# Patient Record
Sex: FEMALE | Race: WHITE | Employment: OTHER | URBAN - NONMETROPOLITAN AREA
[De-identification: names, ages, dates, MRNs, and addresses within clinical notes are randomized per-mention and may not be internally consistent; named-entity substitution may affect disease eponyms.]

---

## 2021-01-04 ENCOUNTER — HOSPITAL ENCOUNTER (OUTPATIENT)
Dept: OCCUPATIONAL THERAPY | Age: 71
Setting detail: THERAPIES SERIES
Discharge: HOME | End: 2021-01-04
Payer: MEDICARE

## 2021-01-04 PROCEDURE — 97165 OT EVAL LOW COMPLEX 30 MIN: CPT

## 2021-01-04 NOTE — DISCHARGE SUMMARY
[facility]  OCCUPATIONAL THERAPY  DRIVING EVALUATION    PATIENT: Alex De La Garza  YOB: 1950  GENDER:  female  CSN: [id number]   REFERRING PHYSICIAN:   Erika Richards PA-C  DIAGNOSIS:  Memory deficit, mood disorder, diabetes, altered mental status  DRIVING HISTORY:    Reports driving in the city and the country. [place]. PMH: Please see medical history questionnaire for past medical history, allergies, and medications. INSURANCE INFORMATION: Medicare  PATIENT GOALS:    Continue driving    OBJECTIVE  VISUAL SKILLS(using the [employer]imag[employer] 2000 Visual Evaluator)        FAR VISUAL ACUITY:   Pass. Left eye 20/40, right eye 20/30, both eyes 20/20     STEREO DEPTH:   Able to correctly detect until 8     FUSION:    Pass. Arrow pointing to number 9     PERIPHERAL VISION:  Pass. Patient able to detect a flash of light at nasal, 55, 70 and 85 with the right and the left. DYNAVISION TESTING:  Mode A 60 second interval with 52 hits. PHYSICAL SKILLS  RANGE OF MOTION:Within functional limits for driving  STRENGTH: Within functional limits for driving  TRANSFER IN/OUT OF SIMULATOR: Within functional limits for driving  AMBULATION: Within functional limits for driving    IN VEHICLE MANIPULATION SKILLS:  Steering Wheel:Within functional limits for driving   Gas Pedal:  Within functional limits for driving  Brake Pedal: Within functional limits for driving  Turn Signal: Within functional limits for driving  Seat Belt: Within functional limits for driving     COGNITIVE SKILLS  ORIENTATION:  Within functional limits for driving  ATTENTION SPAN:Within functional limits for driving  FRUSTRATION TOLERANCE:Within functional limits for driving  IMPULSIVITY:Within functional limits for driving  DIRECTION FOLLOWING:Within functional limits for driving  R/L DISCRIMINATION:Within functional limits for driving  MEMORY:Within functional limits for driving  JUDGMENT: Within functional limits for driving    COGNITIVE TESTING:     Trail Making Part A: 32 seconds    Trail Making Part B: 82 seconds    Clock Test: 4/4       SHORT BLESSED TEST:   The Short Blessed Test is a screening tool to assess orientation, short term memory, long term memory, and reversal of learning. Overall this patient received a score of  0-4 which indicates normal cognition. [place]       SIMULATED DRIVING ASSESSMENT:  WARM-UP:    (54 MPH, 2 nic highway with several curves. Light on-coming traffic. There are no intersections, pedestrians, cross traffic, slow traffic, etc.)    Total off road crashes: 0 (Good performance is 0)   Total collisions with vehicles and roadway objects: 0 (Good performance is 0)   Percentage of time over the posted speed limit: 0 (Good performance is within +/- 5% of the posted speed limit.)   Percentage of time out of lanes: 0 (Good performance would be less than 5%, moderate is less than 10%, greater than 10% is considered poor.)        BRAKE REACTION TIME:  (The brake reaction time test consists of presenting a large stop sign in the center of the visual display. The appropriate response if for the  to release the gas and apply the brakes as quickly as possible.)     Total pedal reaction time: 0.66 seconds (Average value is below 0.7 seconds.)   Gas pedal reaction time: 0.44 seconds (For good performance, this should be less than 0.4 seconds; poor performance is above 0.55 seconds)   Stopping distance: 162 feet (Good performance is less than 175 feet, moderate is between 175 and 220 feet, greater than 220 feet is considered poor.)   Speed at stimulus: 52 mph        SIMULATED DRIVING TREATMENT:  WIDE FIELD DIVIDED ATTENTION:  (In this task, the  will navigate a two nic, six mile highway with both curves and hills. There will be no obstacles in front of the  but there will be on-coming traffic.  Divided Attention (DA) events are presented, however they will only be presented on the side monitors thus forcing the  to scan the entire scene, not just the center monitor. The DA events include only left and right arrows. The  will be presented with sixteen different DA events. The DA events will appear on the outside half of the left and right monitors with eight symbols being shown in the upper quadrant and four will be shown in the lower quadrant. The  will also need to maintain their speed and nic position during the drive.)     Total off road crashes: 0 (Good performance is 0)   Total collisions with vehicles and roadway objects: 0 (Good performance is 0)   Percentage of time over the posted speed limit: 1% (Good performance would be less than 5%, moderate would be less than 10% and anything greater than 10% would be poor)   Percentage of time out of lanes: 0.4% (Good performance would be less than 1%, moderate would be less than 2.5% and anything greater than 2.5% would be poor)   Total correct attention responses: 13/16. (Good performance is all symbols responded to correctly, moderate performance would be missing 1-2.)   Number of upper left symbols missed: 2/4   Number of lower left symbols missed: 0/4   Number of upper right symbols missed: 0/4   Number of lower right symbols missed: 1/4   Average speed: 42 mph (Good performance is +/- 5 MPH of the posted speed limit. Average speed on this drive is 50 MPH.)   Maximum speed: 60 mph   Minimum speed: 28 mph   Notes: Decreased speed noted to increase ability to divide attention. SUBURBAN DRIVE:  (Two-nic residential road and school zones  by curved roadway sections.  Hazards include: pedestrians, cross traffic not stopping at unmarked intersections, and vehicles backing out of drives.)   Total number of off road crashes: 0 (Good performance is 0.)   Total number of collisions with vehicles and other roadway objects: 0 (Good performance is 0.)   Total number of traffic light tickets: 0 (Good performance is 0.)   Total number of stop sign tickets: 0 (Good performance is 0.)   Percentage of time over the posted speed limit: 3% (Good performance would be less than 5%, moderate would be less than 10% and anything greater than 10% would be poor)   Collision with cross traffic vehicles? No   Total pedal reaction time to cross traffic vehicles: 10 seconds  (Average value is below 0.7 seconds)   Collision with backing vehicle? No   Total pedal reaction time to backing vehicle: 2 seconds  (Average value is below 0.7 seconds)   Collision with pedestrian or animal? No   Total pedal reaction time to pedestrian or animal: 0.43 secibds (Average value is below 0.7 seconds)   Did the  exceed the posted speed limit in school zone? Yes 26 mph in a 25 zone     METRO DRIVING:     Notes: Unable to complete the Metro drive due to nausea. ASSESSMENT AND PLAN    RESULTS: Patient tested as safe to return to independent driving in regions that she is familiar. Min difficulty with dividing attention during driving simulation. Controlling attention with decreased speed. RECOMMENDATIONS:   If physician is in agreement, recommend PT for reported balance deficits to avoid/prevent falls and ST for reported memory and cognitive deficits. Patient has been instructed to contact referring physician to discuss results of this evaluation. Patient has been informed that his or her physician is responsible for making the final decision regarding driving status. Thank you for this referral.    History: Low Complexity: brief history completed. Reviewed medical and therapy records related to presenting problem    Performance Deficits/Examination: Low Complexity: 1-2 performance deficits related to presenting problem that result in activity limitations or participation restrictions.       Clinical Decision Making: Clinical Decision making was of Low Complexity as the result of analysis of data from a problem focused assessment, a consideration of a limited number of treatment options, no significant comorbidities affecting the plan of care and no modification or assistance required to complete the evaluation. Evaluation Complexity: Based on the findings of patient history, examination, clinical presentation, and decision making during this evaluation, this patient is of low complexity.     Time in: 0950   Time out: 1100  Timed treatment: 0  Total time: 70 minutes          Rosie Duke, [facility], OTR/L [id number]

## 2024-09-28 ENCOUNTER — APPOINTMENT (OUTPATIENT)
Dept: CT IMAGING | Age: 74
End: 2024-09-28
Payer: MEDICARE

## 2024-09-28 ENCOUNTER — HOSPITAL ENCOUNTER (INPATIENT)
Age: 74
LOS: 4 days | Discharge: HOME | End: 2024-10-02
Attending: EMERGENCY MEDICINE | Admitting: SURGERY
Payer: MEDICARE

## 2024-09-28 DIAGNOSIS — R55 SYNCOPE, UNSPECIFIED SYNCOPE TYPE: ICD-10-CM
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
## 2024-09-28 PROBLEM — I60.9 SUBARACHNOID BLEED (HCC): Status: ACTIVE | Noted: 2024-09-28

## 2024-09-28 LAB
25(OH)D3 SERPL-MCNC: 44 NG/ML (ref 30–100)
ABO + RH BLD: NORMAL
ANION GAP SERPL CALCULATED.3IONS-SCNC: 13 MMOL/L (ref 9–16)
ARM BAND NUMBER: NORMAL
BLOOD BANK SAMPLE EXPIRATION: NORMAL
BLOOD BANK SPECIMEN: ABNORMAL
BLOOD GROUP ANTIBODIES SERPL: NEGATIVE
BODY TEMPERATURE: 37
BUN SERPL-MCNC: 10 MG/DL (ref 8–23)
CHLORIDE SERPL-SCNC: 105 MMOL/L (ref 98–107)
CO2 SERPL-SCNC: 21 MMOL/L (ref 20–31)
COHGB MFR BLD: 6 % (ref 0–5)
CREAT SERPL-MCNC: 0.8 MG/DL (ref 0.5–0.9)
ERYTHROCYTE [DISTWIDTH] IN BLOOD BY AUTOMATED COUNT: 14.6 % (ref 11.8–14.4)
EST. AVERAGE GLUCOSE BLD GHB EST-MCNC: 117 MG/DL
ETHANOL PERCENT: <0.01 %
ETHANOLAMINE SERPL-MCNC: <10 MG/DL (ref 0–0.08)
FIO2 ON VENT: ABNORMAL %
GFR, ESTIMATED: 73 ML/MIN/1.73M2
GLUCOSE SERPL-MCNC: 160 MG/DL (ref 74–99)
HBA1C MFR BLD: 5.7 % (ref 4–6)
HCO3 VENOUS: 20.5 MMOL/L (ref 24–30)
HCT VFR BLD AUTO: 39.1 % (ref 36.3–47.1)
HGB BLD-MCNC: 12.7 G/DL (ref 11.9–15.1)
INR PPP: 1.1
MCH RBC QN AUTO: 31.8 PG (ref 25.2–33.5)
MCHC RBC AUTO-ENTMCNC: 32.5 G/DL (ref 28.4–34.8)
MCV RBC AUTO: 97.8 FL (ref 82.6–102.9)
NRBC BLD-RTO: 0 PER 100 WBC
O2 SAT, VEN: 99.8 % (ref 60–85)
PARTIAL THROMBOPLASTIN TIME: 27.9 SEC (ref 23–36.5)
PCO2 VENOUS: 20.8 MM HG (ref 39–55)
PH VENOUS: 7.6 (ref 7.32–7.42)
PLATELET # BLD AUTO: ABNORMAL K/UL (ref 138–453)
PLATELET, FLUORESCENCE: 60 K/UL (ref 138–453)
PLATELETS.RETICULATED NFR BLD AUTO: 2.5 % (ref 1.1–10.3)
PO2 VENOUS: 135 MM HG (ref 30–50)
POSITIVE BASE EXCESS, VEN: 0.6 MMOL/L (ref 0–2)
POTASSIUM SERPL-SCNC: 4 MMOL/L (ref 3.7–5.3)
PROTHROMBIN TIME: 14.4 SEC (ref 11.7–14.9)
RBC # BLD AUTO: 4 M/UL (ref 3.95–5.11)
SODIUM SERPL-SCNC: 139 MMOL/L (ref 136–145)
T4 FREE SERPL-MCNC: 1.5 NG/DL (ref 0.92–1.68)
TSH SERPL DL<=0.05 MIU/L-ACNC: 1.69 UIU/ML (ref 0.27–4.2)
VIT B12 SERPL-MCNC: 546 PG/ML (ref 232–1245)
WBC OTHER # BLD: 5.5 K/UL (ref 3.5–11.3)

## 2024-09-28 PROCEDURE — 82805 BLOOD GASES W/O2 SATURATION: CPT

## 2024-09-28 PROCEDURE — G0480 DRUG TEST DEF 1-7 CLASSES: HCPCS

## 2024-09-28 PROCEDURE — 6370000000 HC RX 637 (ALT 250 FOR IP)

## 2024-09-28 PROCEDURE — 99285 EMERGENCY DEPT VISIT HI MDM: CPT

## 2024-09-28 PROCEDURE — 84439 ASSAY OF FREE THYROXINE: CPT

## 2024-09-28 PROCEDURE — 6360000002 HC RX W HCPCS

## 2024-09-28 PROCEDURE — 82565 ASSAY OF CREATININE: CPT

## 2024-09-28 PROCEDURE — 2060000000 HC ICU INTERMEDIATE R&B

## 2024-09-28 PROCEDURE — 85055 RETICULATED PLATELET ASSAY: CPT

## 2024-09-28 PROCEDURE — 82947 ASSAY GLUCOSE BLOOD QUANT: CPT

## 2024-09-28 PROCEDURE — 85027 COMPLETE CBC AUTOMATED: CPT

## 2024-09-28 PROCEDURE — 86901 BLOOD TYPING SEROLOGIC RH(D): CPT

## 2024-09-28 PROCEDURE — 6360000004 HC RX CONTRAST MEDICATION

## 2024-09-28 PROCEDURE — 86900 BLOOD TYPING SEROLOGIC ABO: CPT

## 2024-09-28 PROCEDURE — 81001 URINALYSIS AUTO W/SCOPE: CPT

## 2024-09-28 PROCEDURE — 84520 ASSAY OF UREA NITROGEN: CPT

## 2024-09-28 PROCEDURE — 96374 THER/PROPH/DIAG INJ IV PUSH: CPT

## 2024-09-28 PROCEDURE — 80051 ELECTROLYTE PANEL: CPT

## 2024-09-28 PROCEDURE — 86850 RBC ANTIBODY SCREEN: CPT

## 2024-09-28 PROCEDURE — 84443 ASSAY THYROID STIM HORMONE: CPT

## 2024-09-28 PROCEDURE — 99222 1ST HOSP IP/OBS MODERATE 55: CPT | Performed by: SURGERY

## 2024-09-28 PROCEDURE — 84703 CHORIONIC GONADOTROPIN ASSAY: CPT

## 2024-09-28 PROCEDURE — 85610 PROTHROMBIN TIME: CPT

## 2024-09-28 PROCEDURE — 83036 HEMOGLOBIN GLYCOSYLATED A1C: CPT

## 2024-09-28 PROCEDURE — 99221 1ST HOSP IP/OBS SF/LOW 40: CPT | Performed by: INTERNAL MEDICINE

## 2024-09-28 PROCEDURE — 82607 VITAMIN B-12: CPT

## 2024-09-28 PROCEDURE — 92523 SPEECH SOUND LANG COMPREHEN: CPT

## 2024-09-28 PROCEDURE — 85730 THROMBOPLASTIN TIME PARTIAL: CPT

## 2024-09-28 PROCEDURE — 80307 DRUG TEST PRSMV CHEM ANLYZR: CPT

## 2024-09-28 PROCEDURE — 82306 VITAMIN D 25 HYDROXY: CPT

## 2024-09-28 PROCEDURE — 2580000003 HC RX 258

## 2024-09-28 PROCEDURE — 71260 CT THORAX DX C+: CPT

## 2024-09-28 PROCEDURE — 99223 1ST HOSP IP/OBS HIGH 75: CPT | Performed by: PSYCHIATRY & NEUROLOGY

## 2024-09-28 PROCEDURE — 93005 ELECTROCARDIOGRAM TRACING: CPT | Performed by: INTERNAL MEDICINE

## 2024-09-28 RX ORDER — FENTANYL CITRATE 50 UG/ML
50 INJECTION, SOLUTION INTRAMUSCULAR; INTRAVENOUS ONCE
Status: COMPLETED | OUTPATIENT
Start: 2024-09-28 | End: 2024-09-28

## 2024-09-28 RX ORDER — LANOLIN ALCOHOL/MO/W.PET/CERES
1000 CREAM (GRAM) TOPICAL
COMMUNITY

## 2024-09-28 RX ORDER — PANTOPRAZOLE SODIUM 40 MG/1
40 TABLET, DELAYED RELEASE ORAL
Status: DISCONTINUED | OUTPATIENT
Start: 2024-09-28 | End: 2024-10-02 | Stop reason: HOSPADM

## 2024-09-28 RX ORDER — GABAPENTIN 600 MG/1
600 TABLET ORAL NIGHTLY
COMMUNITY
Start: 2024-09-03

## 2024-09-28 RX ORDER — GABAPENTIN 600 MG/1
600 TABLET ORAL NIGHTLY
Status: DISCONTINUED | OUTPATIENT
Start: 2024-09-28 | End: 2024-10-02 | Stop reason: HOSPADM

## 2024-09-28 RX ORDER — LEVOTHYROXINE SODIUM 112 UG/1
112 TABLET ORAL DAILY
Status: DISCONTINUED | OUTPATIENT
Start: 2024-09-28 | End: 2024-10-02 | Stop reason: HOSPADM

## 2024-09-28 RX ORDER — GABAPENTIN 100 MG/1
200 CAPSULE ORAL EVERY MORNING
Status: DISCONTINUED | OUTPATIENT
Start: 2024-09-28 | End: 2024-10-02 | Stop reason: HOSPADM

## 2024-09-28 RX ORDER — LEVOTHYROXINE SODIUM 112 UG/1
112 TABLET ORAL DAILY
COMMUNITY

## 2024-09-28 RX ORDER — POLYETHYLENE GLYCOL 3350 17 G/17G
17 POWDER, FOR SOLUTION ORAL DAILY
Status: DISCONTINUED | OUTPATIENT
Start: 2024-09-28 | End: 2024-09-30

## 2024-09-28 RX ORDER — SODIUM CHLORIDE 9 MG/ML
INJECTION, SOLUTION INTRAVENOUS PRN
Status: DISCONTINUED | OUTPATIENT
Start: 2024-09-28 | End: 2024-09-30

## 2024-09-28 RX ORDER — GABAPENTIN 100 MG/1
200 CAPSULE ORAL EVERY MORNING
COMMUNITY

## 2024-09-28 RX ORDER — PRAVASTATIN SODIUM 40 MG
40 TABLET ORAL DAILY
COMMUNITY
Start: 2024-07-06

## 2024-09-28 RX ORDER — PROPRANOLOL HCL 10 MG
10 TABLET ORAL 2 TIMES DAILY
Status: DISCONTINUED | OUTPATIENT
Start: 2024-09-28 | End: 2024-10-02 | Stop reason: HOSPADM

## 2024-09-28 RX ORDER — PRAZOSIN HYDROCHLORIDE 1 MG/1
2 CAPSULE ORAL NIGHTLY
Status: DISCONTINUED | OUTPATIENT
Start: 2024-09-28 | End: 2024-10-02 | Stop reason: HOSPADM

## 2024-09-28 RX ORDER — LIDOCAINE 4 G/G
1 PATCH TOPICAL DAILY
Status: DISCONTINUED | OUTPATIENT
Start: 2024-09-29 | End: 2024-10-02 | Stop reason: HOSPADM

## 2024-09-28 RX ORDER — IOPAMIDOL 755 MG/ML
130 INJECTION, SOLUTION INTRAVASCULAR
Status: COMPLETED | OUTPATIENT
Start: 2024-09-28 | End: 2024-09-28

## 2024-09-28 RX ORDER — ACETAMINOPHEN 500 MG
1000 TABLET ORAL EVERY 8 HOURS SCHEDULED
Status: DISCONTINUED | OUTPATIENT
Start: 2024-09-28 | End: 2024-10-02 | Stop reason: HOSPADM

## 2024-09-28 RX ORDER — METHOCARBAMOL 750 MG/1
750 TABLET, FILM COATED ORAL 4 TIMES DAILY
Status: DISCONTINUED | OUTPATIENT
Start: 2024-09-28 | End: 2024-09-30

## 2024-09-28 RX ORDER — ONDANSETRON 2 MG/ML
4 INJECTION INTRAMUSCULAR; INTRAVENOUS EVERY 6 HOURS PRN
Status: DISCONTINUED | OUTPATIENT
Start: 2024-09-28 | End: 2024-10-02 | Stop reason: HOSPADM

## 2024-09-28 RX ORDER — BUPROPION HYDROCHLORIDE 300 MG/1
300 TABLET ORAL DAILY
Status: DISCONTINUED | OUTPATIENT
Start: 2024-09-28 | End: 2024-10-02 | Stop reason: HOSPADM

## 2024-09-28 RX ORDER — DULOXETIN HYDROCHLORIDE 60 MG/1
30 CAPSULE, DELAYED RELEASE ORAL DAILY
COMMUNITY

## 2024-09-28 RX ORDER — OXYCODONE HCL 5 MG/5 ML
5 SOLUTION, ORAL ORAL EVERY 6 HOURS PRN
Status: DISCONTINUED | OUTPATIENT
Start: 2024-09-28 | End: 2024-09-28

## 2024-09-28 RX ORDER — UREA 10 %
1000 LOTION (ML) TOPICAL EVERY MORNING
Status: DISCONTINUED | OUTPATIENT
Start: 2024-09-28 | End: 2024-10-02 | Stop reason: HOSPADM

## 2024-09-28 RX ORDER — OMEPRAZOLE 40 MG/1
40 CAPSULE, DELAYED RELEASE ORAL 2 TIMES DAILY
COMMUNITY
Start: 2024-09-21

## 2024-09-28 RX ORDER — POTASSIUM CHLORIDE 29.8 MG/ML
20 INJECTION INTRAVENOUS PRN
Status: DISCONTINUED | OUTPATIENT
Start: 2024-09-28 | End: 2024-09-30

## 2024-09-28 RX ORDER — DULOXETIN HYDROCHLORIDE 30 MG/1
30 CAPSULE, DELAYED RELEASE ORAL DAILY
Status: DISCONTINUED | OUTPATIENT
Start: 2024-09-28 | End: 2024-10-02 | Stop reason: HOSPADM

## 2024-09-28 RX ORDER — OXYCODONE HYDROCHLORIDE 5 MG/1
5 TABLET ORAL EVERY 6 HOURS PRN
Status: DISCONTINUED | OUTPATIENT
Start: 2024-09-28 | End: 2024-10-02

## 2024-09-28 RX ORDER — ONDANSETRON 4 MG/1
4 TABLET, ORALLY DISINTEGRATING ORAL EVERY 8 HOURS PRN
Status: DISCONTINUED | OUTPATIENT
Start: 2024-09-28 | End: 2024-10-02 | Stop reason: HOSPADM

## 2024-09-28 RX ORDER — ACETAMINOPHEN 325 MG/1
650 TABLET ORAL EVERY 6 HOURS PRN
Status: DISCONTINUED | OUTPATIENT
Start: 2024-09-28 | End: 2024-09-28

## 2024-09-28 RX ORDER — PROPRANOLOL HCL 10 MG
10 TABLET ORAL 2 TIMES DAILY
COMMUNITY
Start: 2024-08-18

## 2024-09-28 RX ORDER — GABAPENTIN 300 MG/1
300 CAPSULE ORAL 3 TIMES DAILY
Status: DISCONTINUED | OUTPATIENT
Start: 2024-09-28 | End: 2024-09-28

## 2024-09-28 RX ORDER — PRAZOSIN HYDROCHLORIDE 1 MG/1
2 CAPSULE ORAL NIGHTLY
COMMUNITY
Start: 2024-09-09

## 2024-09-28 RX ORDER — BUPROPION HYDROCHLORIDE 150 MG/1
300 TABLET ORAL DAILY
COMMUNITY

## 2024-09-28 RX ORDER — MAGNESIUM SULFATE IN WATER 40 MG/ML
2000 INJECTION, SOLUTION INTRAVENOUS PRN
Status: DISCONTINUED | OUTPATIENT
Start: 2024-09-28 | End: 2024-09-30

## 2024-09-28 RX ORDER — POTASSIUM CHLORIDE 7.45 MG/ML
10 INJECTION INTRAVENOUS PRN
Status: DISCONTINUED | OUTPATIENT
Start: 2024-09-28 | End: 2024-09-30

## 2024-09-28 RX ORDER — PRAVASTATIN SODIUM 20 MG
40 TABLET ORAL DAILY
Status: DISCONTINUED | OUTPATIENT
Start: 2024-09-28 | End: 2024-10-02 | Stop reason: HOSPADM

## 2024-09-28 RX ORDER — SODIUM CHLORIDE 0.9 % (FLUSH) 0.9 %
5-40 SYRINGE (ML) INJECTION EVERY 12 HOURS SCHEDULED
Status: DISCONTINUED | OUTPATIENT
Start: 2024-09-28 | End: 2024-09-30

## 2024-09-28 RX ORDER — SODIUM CHLORIDE 0.9 % (FLUSH) 0.9 %
5-40 SYRINGE (ML) INJECTION PRN
Status: DISCONTINUED | OUTPATIENT
Start: 2024-09-28 | End: 2024-10-02 | Stop reason: HOSPADM

## 2024-09-28 RX ADMIN — FENTANYL CITRATE 50 MCG: 50 INJECTION, SOLUTION INTRAMUSCULAR; INTRAVENOUS at 03:18

## 2024-09-28 RX ADMIN — LEVOTHYROXINE SODIUM 112 MCG: 112 TABLET ORAL at 07:53

## 2024-09-28 RX ADMIN — PROPRANOLOL HYDROCHLORIDE 10 MG: 10 TABLET ORAL at 07:53

## 2024-09-28 RX ADMIN — CYANOCOBALAMIN TAB 500 MCG 1000 MCG: 500 TAB at 08:01

## 2024-09-28 RX ADMIN — OXYCODONE HYDROCHLORIDE 5 MG: 5 SOLUTION ORAL at 20:31

## 2024-09-28 RX ADMIN — PRAZOSIN HYDROCHLORIDE 2 MG: 1 CAPSULE ORAL at 21:54

## 2024-09-28 RX ADMIN — ACETAMINOPHEN 1000 MG: 500 TABLET ORAL at 21:54

## 2024-09-28 RX ADMIN — OXYCODONE HYDROCHLORIDE 5 MG: 5 SOLUTION ORAL at 07:58

## 2024-09-28 RX ADMIN — PANTOPRAZOLE SODIUM 40 MG: 40 TABLET, DELAYED RELEASE ORAL at 16:30

## 2024-09-28 RX ADMIN — GABAPENTIN 300 MG: 300 CAPSULE ORAL at 08:00

## 2024-09-28 RX ADMIN — METHOCARBAMOL 750 MG: 750 TABLET ORAL at 13:25

## 2024-09-28 RX ADMIN — SODIUM CHLORIDE, PRESERVATIVE FREE 10 ML: 5 INJECTION INTRAVENOUS at 07:54

## 2024-09-28 RX ADMIN — DULOXETINE HYDROCHLORIDE 30 MG: 30 CAPSULE, DELAYED RELEASE ORAL at 07:53

## 2024-09-28 RX ADMIN — IOPAMIDOL 130 ML: 755 INJECTION, SOLUTION INTRAVENOUS at 01:03

## 2024-09-28 RX ADMIN — METHOCARBAMOL 750 MG: 750 TABLET ORAL at 07:54

## 2024-09-28 RX ADMIN — GABAPENTIN 200 MG: 100 CAPSULE ORAL at 07:54

## 2024-09-28 RX ADMIN — GABAPENTIN 300 MG: 300 CAPSULE ORAL at 13:25

## 2024-09-28 RX ADMIN — PANTOPRAZOLE SODIUM 40 MG: 40 TABLET, DELAYED RELEASE ORAL at 07:53

## 2024-09-28 RX ADMIN — METHOCARBAMOL 750 MG: 750 TABLET ORAL at 20:32

## 2024-09-28 RX ADMIN — PRAVASTATIN SODIUM 40 MG: 20 TABLET ORAL at 07:54

## 2024-09-28 RX ADMIN — BUPROPION HYDROCHLORIDE 300 MG: 300 TABLET, EXTENDED RELEASE ORAL at 07:53

## 2024-09-28 RX ADMIN — GABAPENTIN 600 MG: 600 TABLET, FILM COATED ORAL at 20:32

## 2024-09-28 RX ADMIN — ONDANSETRON 4 MG: 2 INJECTION INTRAMUSCULAR; INTRAVENOUS at 04:54

## 2024-09-28 RX ADMIN — METHOCARBAMOL 750 MG: 750 TABLET ORAL at 16:30

## 2024-09-28 RX ADMIN — PROPRANOLOL HYDROCHLORIDE 10 MG: 10 TABLET ORAL at 20:32

## 2024-09-28 RX ADMIN — SODIUM CHLORIDE, PRESERVATIVE FREE 10 ML: 5 INJECTION INTRAVENOUS at 20:32

## 2024-09-28 ASSESSMENT — PAIN SCALES - GENERAL
PAINLEVEL_OUTOF10: 8
PAINLEVEL_OUTOF10: 4
PAINLEVEL_OUTOF10: 7
PAINLEVEL_OUTOF10: 3
PAINLEVEL_OUTOF10: 5
PAINLEVEL_OUTOF10: 9
PAINLEVEL_OUTOF10: 3
PAINLEVEL_OUTOF10: 3
PAINLEVEL_OUTOF10: 7

## 2024-09-28 ASSESSMENT — PAIN DESCRIPTION - LOCATION
LOCATION: HEAD;BACK
LOCATION: HEAD;NECK
LOCATION: NECK
LOCATION: HEAD;NECK
LOCATION: BACK;HEAD;NECK
LOCATION: NECK;BACK

## 2024-09-28 ASSESSMENT — PAIN - FUNCTIONAL ASSESSMENT
PAIN_FUNCTIONAL_ASSESSMENT: PREVENTS OR INTERFERES SOME ACTIVE ACTIVITIES AND ADLS
PAIN_FUNCTIONAL_ASSESSMENT: PREVENTS OR INTERFERES SOME ACTIVE ACTIVITIES AND ADLS

## 2024-09-28 ASSESSMENT — PAIN DESCRIPTION - DESCRIPTORS
DESCRIPTORS: ACHING;DISCOMFORT
DESCRIPTORS: ACHING;DISCOMFORT
DESCRIPTORS: ACHING
DESCRIPTORS: ACHING

## 2024-09-28 ASSESSMENT — PAIN DESCRIPTION - ORIENTATION
ORIENTATION: MID;PROXIMAL
ORIENTATION: MID;POSTERIOR

## 2024-09-28 NOTE — PROGRESS NOTES
15 Variable Trauma-Specific Frailty Index   Comorbidities   Cancer History Yes (1) - kidney No (0)   Coronary Heart Disease MI (1) CABG (0.75) Mild (0.25)  No (0)   Dementia Severe (1) Moderate (0.5) Mild (0.25)  No (0)   Daily Activities   Help With Grooming Yes (1) No (0)   Help with Managing Money Yes (1) No (0)   Help doing Housework Yes (1) No (0)   Help with Toileting Yes (1) No (0)   Help Walking Wheelchair (1) Walker (0.75) Cane (0.5) No (0)   Health Attitude   Feel Less Useful Most Time (1) Sometimes (0.5) Never (0)   Feel Sad Most Time (1) Sometimes (0.5) Never (0)   Feel Effort to Do Everything Most Time (1) Sometimes (0.5) Never (0)   Feels Lonely Most Time (1) Sometimes (0.5) Never (0)   Falls Most Time (1) Sometimes (0.5) Never (0)   Function   Sexually Active Yes (0)  No(1)   Nutrition   Albumin < 3g/dL (1)  > 3g/dL   Scoring   Score   FI (Score/15)  5.5/15 > 0.25 = Frail   *Based on 2-weeks prior to hospital admission   Trauma Specific Fraility Index > or = to 4 (4/15 = 0.26)  Trauma Specific Fraility Index Score:    5.5/15      Brea Ortiz DO 09/28/24  4:16 AM   General Surgery PGY 1

## 2024-09-28 NOTE — H&P
TRAUMA H&P/CONSULT    PATIENT NAME: Paradise Fairchild  YOB: 1950  MEDICAL RECORD NO. [id number]   DATE: 9/28/2024  PRIMARY CARE PHYSICIAN: Albaro Rodriges DO  PATIENT EVALUATED AT THE REQUEST OF : Sang NOLAN   Trauma Priority      IMPRESSION AND PLAN:   74Y/F presented with fall from standing height without loss of consciousness. No anticoagulation. Transferred from [facility] with CT head showing tiny SAH.     Diagnosis: SAH  Plan: BIG 2  Plan for admission to stepdown   Monitor neuro checks  C collar removed at outside facility  Strict bedrest  Home meds reordered  Geriatrics consulted due to +frailty, appreciate recommendations      If intracranial hemorrhage is present, is it a:  [] BIG 1  [x] BIG 2  [] BIG 3  If chest wall injury: Rib score___    CONSULT SERVICES    none     HISTORY:     Chief Complaint:  \"My head hurts\"    GENERAL DATA  Patient information was obtained from patient.  History/Exam limitations: none.  Injury Date: 9/27/24   Approximate Injury Time: unknown        Transport mode: Ambulance  Referring Hospital: [facility]    SETTING OF TRAUMATIC EVENT   Location : Home  Specific Details of Location: Other: unknown    MECHANISM OF INJURY    Fall From standing      HISTORY:     Paradise Fairchild is a female that presented to the Emergency Department following a fall at home. Pt reports she is unsure of the events leading up to the fall. She does report hitting her head, no LOC. Denies anticoagulation.    Traumatic loss of Consciousness: No    Total Fluids Given Prior To Arrival  mL    MEDICATIONS:   []  None     []  Information not available due to exam limitations documented above    Prior to Admission medications    Not on File       ALLERGIES:   []  None    []   Information not available due to exam limitations documented above     Nsaids    PAST MEDICAL/SURGICAL HISTORY: []  None   []   Information not available due to exam limitations documented above

## 2024-09-28 NOTE — ED PROVIDER NOTES
[facility] ED  Emergency Department Encounter  Emergency Medicine Resident     Pt Name:Paradise Fairchild  MRN: [id number]  Birthdate 1950  Date of evaluation: 9/28/24  PCP:  Albaro Rodriges DO  Note Started: 1:00 AM EDT      CHIEF COMPLAINT       Chief Complaint   Patient presents with    Fall     [facility]       HISTORY OF PRESENT ILLNESS  (Location/Symptom, Timing/Onset, Context/Setting, Quality, Duration, Modifying Factors, Severity.)      Paradise Fairchild is a 74 y.o. female past medical history of diabetes who presents with EMS as a transfer from outside hospital due to subarachnoid hemorrhage after a fall on 9/27.  Patient does not remember the fall, was at home, lives with her .  Patient denies blood thinners.  Reportedly this is a fall from standing.    PAST MEDICAL / SURGICAL / SOCIAL / FAMILY HISTORY      has no past medical history on file.     has no past surgical history on file.    Social History     Socioeconomic History    Marital status:      Spouse name: Not on file    Number of children: Not on file    Years of education: Not on file    Highest education level: Not on file   Occupational History    Not on file   Tobacco Use    Smoking status: Not on file    Smokeless tobacco: Not on file   Substance and Sexual Activity    Alcohol use: Not on file    Drug use: Not on file    Sexual activity: Not on file   Other Topics Concern    Not on file   Social History Narrative    Not on file     Social Determinants of Health     Financial Resource Strain: Not on file   Food Insecurity: Not on file   Transportation Needs: Not on file   Physical Activity: Not on file   Stress: Not on file   Social Connections: Not on file   Intimate Partner Violence: Not on file   Housing Stability: Not on file       History reviewed. No pertinent family history.    Allergies:  Nsaids    Home Medications:  Prior to Admission medications    Not on File       REVIEW OF SYSTEMS

## 2024-09-28 NOTE — ED NOTES
ED to inpatient nurses report      Chief Complaint:  Chief Complaint   Patient presents with    Fall     [facility]     Present to ED from: [facility] transfer    MOA:     LOC: alert and orientated to name, place, date  Mobility: Requires assistance * 1  Oxygen Baseline: Room air    Current needs required: 2L NC   Pending ED orders: N/A  Present condition: Alert and oriented x4. Pt has SH, c/o neck and back pain, denies blood thinner use    Why did the patient come to the ED?   Pt presents to ED via [facility] transfer d/t SH d/t fall that occurred on 9/27. Pt arrived alert and oriented x4 and c/o neck pain. Pt arrived in outside clothing and was changed into gown and provided with blankets upon arrival. Pt denies any blood thinner use.     What is the plan? Admission   Any procedures or intervention occur? Labs, imaging  Any safety concerns?? Fall risk    Mental Status:       Psych Assessment:      Vital signs   Vitals:    09/28/24 0323 09/28/24 0325 09/28/24 0328 09/28/24 0343   BP:   124/63 131/60   Pulse: 69 70 68 62   Resp: (!) 9 10 11 10   Temp:       SpO2: (!) 85% 99% 99% 98%   Weight:       Height:            Vitals:  Patient Vitals for the past 24 hrs:   BP Temp Pulse Resp SpO2 Height Weight   09/28/24 0343 131/60 -- 62 10 98 % -- --   09/28/24 0328 124/63 -- 68 11 99 % -- --   09/28/24 0325 -- -- 70 10 99 % -- --   09/28/24 0323 -- -- 69 (!) 9 (!) 85 % -- --   09/28/24 0312 138/66 -- 70 17 91 % -- --   09/28/24 0055 -- -- 71 12 94 % -- --   09/28/24 0051 -- -- -- -- -- 1.575 m (5' 2\") 64.4 kg (142 lb)   09/28/24 0051 -- 99 °F (37.2 °C) -- -- -- -- --   09/28/24 0051 (!) 159/83 -- 74 -- 94 % -- --   09/28/24 0050 -- -- -- -- 94 % -- --   09/28/24 0034 (!) 130/59 -- 79 -- 98 % -- --      Visit Vitals  /60   Pulse 62   Temp 99 °F (37.2 °C)   Resp 10   Ht 1.575 m (5' 2\")   Wt 64.4 kg (142 lb)   SpO2 98%   BMI 25.97 kg/m²        LDAs:   Peripheral IV 09/28/24 Left Antecubital

## 2024-09-28 NOTE — CONSULTS
[facility] Neurology   IN-PATIENT SERVICE      NEUROLOGY CONSULT  NOTE            Date:   9/28/2024  Patient name:  Paradise Fairchild  Date of admission:  9/28/2024  YOB: 1950      Chief Complaint:     Chief Complaint   Patient presents with   • Fall     [facility]     Reason for Consult:      LOC, concern for seizure     History of Present Illness:     The patient is a 74 y.o. female with PMH of DM type II, IBS who initially presented to [facility] after unwitnessed fall from standing high that patient does not remember, assuming positive LOC, there was a concern for subsequent delayed return to the [facility]. Patient was found to have small SAH, BIG2 and transferred to [facility]       Patient denies having any prodromes, tongue biting or urinary/bladder incontinence, no previous history of the seizures or LOC. IM is doing syncopal work up    Patient follows up with neurologist outpatient for chronic headache and has scheduled outpatient MRI     Past Medical History:     History reviewed. No pertinent past medical history.     Past Surgical History:     History reviewed. No pertinent surgical history.     Medications Prior to Admission:     Prior to Admission medications    Medication Sig Start Date End Date Taking? Authorizing Provider   gabapentin (NEURONTIN) 600 MG tablet Take 1 tablet by mouth nightly. at bedtime. 9/3/24  Yes ProviderOrly MD   omeprazole (PRILOSEC) 40 MG delayed release capsule Take 1 capsule by mouth in the morning and at bedtime 9/21/24  Yes ProviderOrly MD   pravastatin (PRAVACHOL) 40 MG tablet Take 1 tablet by mouth daily 7/6/24  Yes ProviderOrly MD   prazosin (MINIPRESS) 1 MG capsule Take 2 capsules by mouth nightly 9/9/24  Yes ProviderOrly MD   propranolol (INDERAL) 10 MG tablet Take 1 tablet by mouth 2 times daily 8/18/24  Yes ProviderOrly MD   tiZANidine (ZANAFLEX) 4 MG tablet Take 1 tablet by mouth every 8 hours  mass is seen.     1. No acute traumatic injury of the chest, abdomen, or pelvis. 2. No acute osseous abnormality of the thoracic or lumbar spine. 3. Cirrhosis with moderate splenomegaly and small volume of ascites.       Impression:      The patient is a 74 y.o. female with PMH of DM type II, IBS who initially presented to [facility] after unwitnessed fall from standing high that patient does not remember, assuming positive LOC, there was a concern for subsequent delayed return to the baseline. Neurology was consulted for the seizure work up.    Plan:     Collapse, likely syncopal episode, r/o seizures   SAH, BIG2  - will obtain routine EEG   - MRI brain W/WO contrast   - no indications for AEDs at this time unless above work up is abnormal   - orthostatic vitals: orders   - consider ECHO   - fall precautions     Electronically signed by Irina Bah MD on 9/28/2024 at 3:02 PM      Electronically signed by   Irina Bah MD  9/28/2024  3:02 PM

## 2024-09-28 NOTE — CONSULTS
[facility]  Office: [phone]  Barry Moe DO, Everett Cohen, DO, Abdulaziz Colindres DO, Prashant Benitez, DO, Steffanie Escamilla MD, Margarita Keene MD, Eliza Yang MD, Tatyana Swain MD,  Kin Simental MD, Willie Gonzalez MD, Marii Pedroza MD,  Jerman Soriano DO, Porfirio Wilder MD, Steven Rodriges MD, Duane Moe DO, Funmi Reyez MD,  Gerard Franco DO, Kelli Garibay MD, Chrissie Martinez MD, Lou Zhu MD, Margarita Corbin MD,  Rick Cooley MD, Sabi Conti MD, Geeta Lopes MD, Richelle Marie MD, Hang Helm MD, Nurys Monson MD, Yoel Rand DO, Jaylen Harrington DO, Saúl Olmos DO, Ayan Badillo MD, Shirley Waterhouse, CNP,  Jennyfer Mcallister CNP, Yeol Mitchell, CNP,  Niya Roldan, DNP, Camille Cabral, CNP, Lavinia Brown, CNP, Geni Wilkerson, CNP, Saira Reynolds, CNP, Ailyn Mcintyre, PA-C, Beryl Mcmahan PA-C, Mary Anne Kaur, CNP, Sandy Sanchez, CNP,  Madhuri Arreola, CNP, Jeny Goldman, CNP, Nelly Wu, CNP, Serena Collazo, CNS, Antionette Almaguer, CNP, Negra Barber, CNP, Tracy Schwab, CNP         [facility]   IN-PATIENT SERVICE   [facility]    CONSULTATION / HISTORY AND PHYSICAL EXAMINATION            Date:   9/28/2024  Patient name:  Paradise Fairchild  Date of admission:  9/28/2024 12:36 AM  MRN:   [id number]  Account:  [id number]  YOB: 1950  PCP:    Albaro Rodriges DO  Room:   [facility]0139-  Code Status:    Full Code    Physician Requesting Consult: Yanick Prieto*    Reason for Consult: Frailty    Chief Complaint:     Chief Complaint   Patient presents with    Fall     [facility]       History Obtained From:     patient, electronic medical record    History of Present Illness:     74-year-old female presents to the hospital for evaluation after having a fall at home.  Patient does not recall events after a fall.  She denies any prodromal symptoms.  Denies any history of seizures.  Denies any cardiac home  a harm to self or others can try oral low dose Seroquel or IM zyprexa if Qtc stable. Avoid physical restraints is possible.  Avoid benzodiazepines for delirium. Avoid anticholinergics.   Recommend Dietary consult to help optimize nutrition  Needs aggressive PTOT- Recommend turning patient per protocol to avoid wounds. Maintain fall precautions.   Monitor for urinary retention.    Recommend workup of syncope.  Will consult neurology, check EEG since patient had some confusion that could have been post ictal/ did not immediatly return to baseline. Check ECG, telemetry . Check echo if ecg shows any abnormalities . Check orthostatic blood pressure and pulse   Continue medication      Consultations:   IP CONSULT TO GERIATRICS      Jerman Soriano DO  9/28/2024  10:33 AM    Copy sent to Albaro Drummond DO

## 2024-09-28 NOTE — PROGRESS NOTES
Trauma Tertiary Survey    Admit Date: 9/28/2024  Hospital day 0      Subjective:     Patient is a 71-year-old female who presents reports a fall from standing height.  Patient is sitting in her chair resting comfortably.  Patient is about to eat dinner.  Patient's pain is controlled otherwise.    Objective:   PHYSICAL EXAM:   Physical Exam  Constitutional:       Appearance: Normal appearance.   HENT:      Head: Normocephalic and atraumatic.   Eyes:      Extraocular Movements: Extraocular movements intact.      Pupils: Pupils are equal, round, and reactive to light.   Cardiovascular:      Rate and Rhythm: Normal rate and regular rhythm.      Pulses: Normal pulses.   Pulmonary:      Effort: Pulmonary effort is normal.   Abdominal:      General: Abdomen is flat.      Palpations: Abdomen is soft.   Musculoskeletal:         General: Normal range of motion.      Cervical back: Normal range of motion. Tenderness present.   Skin:     General: Skin is warm and dry.   Neurological:      General: No focal deficit present.      Mental Status: She is alert.   Psychiatric:         Mood and Affect: Mood normal.         Behavior: Behavior normal.           Spine:     Spine Tenderness ROM   Cervical 2 /10 Normal   Thoracic 0 /10 Normal   Lumbar 0 /10 Normal     Musculoskeletal    Joint Tenderness Swelling ROM   Right shoulder absent absent normal   Left shoulder absent absent normal   Right elbow absent absent normal   Left elbow absent absent normal   Right wrist absent absent normal   Left wrist absent absent normal   Right hand grasp absent absent normal   Left hand grasp absent absent normal   Right hip absent absent normal   Left hip absent absent normal   Right knee absent absent normal   Left knee absent absent normal   Right ankle absent absent normal   Left ankle absent absent normal   Right foot absent absent normal   Left foot absent absent normal       CONSULTS: Internal medicine    PROCEDURES: None    [x] Reviewed

## 2024-09-28 NOTE — PROGRESS NOTES
Facility/Department: [address] STEPDOWN  Initial Speech/Language/Cognitive Assessment    NAME: Paradise Fairchild  : 1950   MRN: [id number]  ADMISSION DATE: 2024  ADMITTING DIAGNOSIS: has Subarachnoid bleed (HCC) and Fall on their problem list.    DATE ONSET: 2024      Date of Eval: 2024   Evaluating Therapist: Keyla Durand, SLP      RECENT RESULTS  CT OF HEAD/MRI: not available      Primary Complaint:   Paradise Fairchild is a 73 yo woman who had an unwitnessed fall at home, falling backwards and hitting the back of her head. She has no recollection of how it happened. Found to have small subarachnoid hemorrhage on exam     Pt transferred to [facility] for further assessment and management        IMPRESSIONS  Pt passed cognitive assessment  No overt clinical s/s of cognitive impairments at this time  Pt's spouse present and agrees        RECOMMENDATIONS  Skilled ST is not recommended      Pain:  Pain Assessment  Pain Assessment: 0-10  Pain Level: 3  Patient's Stated Pain Goal: 0 - No pain  Pain Location: Back, Head, Neck  Pain Descriptors: Aching  Functional Pain Assessment: Prevents or interferes some active activities and ADLs  Non-Pharmaceutical Pain Intervention(s): Rest  Response to Pain Intervention: Patient satisfied  Side Effects: No reported side effects    Vision/ Hearing       Assessment:  Cognitive Diagnosis: WFL  Aphasia Diagnosis: No s/s of aphasia  Speech Diagnosis: no s/s of motor speech disorder   Diagnosis: WFL    Recommendations:  Recommendations  Requires SLP Intervention: No             Plan:   Speech Therapy Prognosis  Prognosis: Good  Prognosis Considerations: Age;Medical Prognosis;Participation Level;Potential;Previous Level of Function;Family/Community Support;Parental Carry-over of Home Programming;Medical Diagnosis;Severity of Impairments    Goals:      Patient/family involved in developing goals and treatment plan: yes    Subjective:   Previous level of function and  limitations:  x25 years (2nd marriage for both) they have 5 children between them with 10 grandchildren and 6 great-grandchildren. Pt was employed as a  for the [employer]. She completed high school and earned a Bachelor's degree                    Objective:       Motor Speech  Apraxic Characteristics: None  Dysarthric Characteristics: None    Auditory Comprehension  Comprehension: Within Functional Limits         Expression  Primary Mode of Expression: Verbal    Verbal Expression  Verbal Expression: Within functional limits                   Cognition:      Orientation  Overall Orientation Status: Within Functional Limits  Attention  Attention: Within Functional Limits  Memory  Memory: Within Functional Limits  Problem Solving  Problem Solving: Within Functional Limits    Additional Assessments:          Prognosis:  Speech Therapy Prognosis  Prognosis: Good  Prognosis Considerations: Age;Medical Prognosis;Participation Level;Potential;Previous Level of Function;Family/Community Support;Parental Carry-over of Home Programming;Medical Diagnosis;Severity of Impairments    Education:             Therapy Time:   Individual Concurrent Group Co-treatment   Time In  138         Time Out  153         Minutes                   Electronically signed by KIM Abad on 9/28/2024 at 1:53 PM

## 2024-09-28 NOTE — CARE COORDINATION
SBIRT completed with pt. Pt admitted after fall at home.  Pt reports she drinks alcohol very rarely and if she does, has half glass of wine. She denies all drug use. Pt does admit to recent feelings of depression. She denies any SI. Stated her PCP has her on meds for depression. Stated she has had prior counseling. Pt was receptive to receiving additional counseling resources, which were provided. Pt encouraged to keep her PCP updated on how she is feeling.            Alcohol Screening and Brief Intervention        No results for input(s): \"ALC\" in the last 72 hours.    Alcohol Pre-screening     (WOMEN ONLY) How many times in the past year have you had 4 or more drinks in a day?: None       Drug Pre-Screening  - none       Drug Screening DAST       Mood Pre-Screening (PHQ-2)  During the past 2 weeks, have you been bothered by, feeling down, depressed or hopeless?  Yes  Feeling down, depressed or hopeless      I have interviewed Paradise Fairchild, [id number] regarding  Her alcohol consumption/drug use and risk for excessive use. Screenings were negative.  Patient  N/A intervention at this time.   Deferred []    Completed on: 9/28/2024   PADMINI GIRON

## 2024-09-28 NOTE — ED NOTES
Pt presents to ED via [facility] transfer d/t SH d/t fall that occurred on 9/27. Pt arrived alert and oriented x4 and c/o neck pain. Pt arrived in outside clothing and was changed into gown and provided with blankets upon arrival. Pt denies any blood thinner use.  Pt placed on full cardiac monitor, IV access established. Labs drawn.

## 2024-09-28 NOTE — ED PROVIDER NOTES
[facility]     Emergency Department     Faculty Attestation    I performed a history and physical examination of the patient and discussed management with the resident. I reviewed the resident’s note and agree with the documented findings including all diagnostic interpretations and plan of care. Any areas of disagreement are noted on the chart. I was personally present for the key portions of any procedures. I have documented in the chart those procedures where I was not present during the key portions. I have reviewed the emergency nurses triage note. I agree with the chief complaint, past medical history, past surgical history, allergies, medications, social and family history as documented unless otherwise noted below. Documentation of the HPI, Physical Exam and Medical Decision Making performed by naren is based on my personal performance of the HPI, PE and MDM. For Physician Assistant/ Nurse Practitioner cases/documentation I have personally evaluated this patient and have completed at least one if not all key elements of the E/M (history, physical exam, and MDM). Additional findings are as noted.    Primary Care Physician: Albaro Rodriges, DO    Note Started: 2:54 AM EDT     VITAL SIGNS:   height is 1.575 m (5' 2\") and weight is 64.4 kg (142 lb). Her temperature is 99 °F (37.2 °C). Her blood pressure is 159/83 (abnormal) and her pulse is 71. Her respiration is 12 and oxygen saturation is 94%.      Medical Decision Making  Fall, transfer from outlying facility.  Found to have small subarachnoid hemorrhage on exam.  Patient is alert oriented no reported blood thinners.  No numbness no weakness.  Airway intact equal breath sounds bilaterally.  Good pulses in all 4 extremities.  Moving all 4 extremities equally.  Impression subarachnoid hemorrhage.  Trauma priority based on injury.  Admission.    Amount and/or Complexity of Data Reviewed  Independent  Historian: EMS  External Data Reviewed: radiology and notes.  Labs: ordered. Decision-making details documented in ED Course.  Radiology: ordered.  Discussion of management or test interpretation with external provider(s): Trauma    Risk  Decision regarding hospitalization.        CRITICAL CARE: There was a high probability of clinically significant/life threatening deterioration in this patient's condition which required my urgent intervention.  Total critical care time was 18 minutes.  This excludes any time for separately reportable procedures.     Ariel Domínguez MD, FACEP, FAAEM  Attending Emergency Physician        Ariel Domínguez MD  09/28/24 0253

## 2024-09-29 ENCOUNTER — APPOINTMENT (OUTPATIENT)
Dept: CT IMAGING | Age: 74
End: 2024-09-29
Payer: MEDICARE

## 2024-09-29 PROBLEM — E03.9 ACQUIRED HYPOTHYROIDISM: Status: ACTIVE | Noted: 2024-09-29

## 2024-09-29 PROBLEM — R55 SYNCOPE: Status: ACTIVE | Noted: 2024-09-29

## 2024-09-29 PROBLEM — F32.A DEPRESSION: Status: ACTIVE | Noted: 2024-09-29

## 2024-09-29 PROBLEM — K21.9 GERD (GASTROESOPHAGEAL REFLUX DISEASE): Status: ACTIVE | Noted: 2024-09-29

## 2024-09-29 PROBLEM — M35.00 SJOGREN'S DISEASE (HCC): Status: ACTIVE | Noted: 2024-09-29

## 2024-09-29 PROBLEM — D69.6 THROMBOCYTOPENIA (HCC): Status: ACTIVE | Noted: 2024-09-29

## 2024-09-29 PROBLEM — R25.1 TREMOR: Status: ACTIVE | Noted: 2024-09-29

## 2024-09-29 PROBLEM — M79.7 FIBROMYALGIA: Status: ACTIVE | Noted: 2024-09-29

## 2024-09-29 LAB
ALBUMIN SERPL-MCNC: 3.2 G/DL (ref 3.5–5.2)
AMPHET UR QL SCN: NEGATIVE
ANION GAP SERPL CALCULATED.3IONS-SCNC: 8 MMOL/L (ref 9–16)
BACTERIA URNS QL MICRO: NORMAL
BARBITURATES UR QL SCN: NEGATIVE
BASOPHILS # BLD: <0.03 K/UL (ref 0–0.2)
BASOPHILS NFR BLD: 1 % (ref 0–2)
BENZODIAZ UR QL: NEGATIVE
BILIRUB UR QL STRIP: NEGATIVE
BUN SERPL-MCNC: 12 MG/DL (ref 8–23)
CALCIUM SERPL-MCNC: 8.2 MG/DL (ref 8.6–10.4)
CANNABINOIDS UR QL SCN: NEGATIVE
CASTS #/AREA URNS LPF: NORMAL /LPF (ref 0–8)
CHLORIDE SERPL-SCNC: 104 MMOL/L (ref 98–107)
CLARITY UR: CLEAR
CO2 SERPL-SCNC: 26 MMOL/L (ref 20–31)
COCAINE UR QL SCN: NEGATIVE
COLOR UR: YELLOW
CREAT SERPL-MCNC: 1 MG/DL (ref 0.5–0.9)
EKG ATRIAL RATE: 65 BPM
EKG P-R INTERVAL: 160 MS
EKG Q-T INTERVAL: 418 MS
EKG QRS DURATION: 64 MS
EKG QTC CALCULATION (BAZETT): 434 MS
EKG R AXIS: -166 DEGREES
EKG T AXIS: 135 DEGREES
EKG VENTRICULAR RATE: 65 BPM
EOSINOPHIL # BLD: 0.14 K/UL (ref 0–0.44)
EOSINOPHILS RELATIVE PERCENT: 4 % (ref 1–4)
EPI CELLS #/AREA URNS HPF: NORMAL /HPF (ref 0–5)
ERYTHROCYTE [DISTWIDTH] IN BLOOD BY AUTOMATED COUNT: 14.7 % (ref 11.8–14.4)
FENTANYL UR QL: POSITIVE
GFR, ESTIMATED: 57 ML/MIN/1.73M2
GLUCOSE BLD-MCNC: 175 MG/DL (ref 65–105)
GLUCOSE BLD-MCNC: 240 MG/DL (ref 65–105)
GLUCOSE SERPL-MCNC: 163 MG/DL (ref 74–99)
GLUCOSE UR STRIP-MCNC: NEGATIVE MG/DL
HCT VFR BLD AUTO: 34.9 % (ref 36.3–47.1)
HGB BLD-MCNC: 11.4 G/DL (ref 11.9–15.1)
HGB UR QL STRIP.AUTO: NEGATIVE
IMM GRANULOCYTES # BLD AUTO: <0.03 K/UL (ref 0–0.3)
IMM GRANULOCYTES NFR BLD: 1 %
KETONES UR STRIP-MCNC: NEGATIVE MG/DL
LEUKOCYTE ESTERASE UR QL STRIP: ABNORMAL
LYMPHOCYTES NFR BLD: 1.03 K/UL (ref 1.1–3.7)
LYMPHOCYTES RELATIVE PERCENT: 27 % (ref 24–43)
MCH RBC QN AUTO: 31.1 PG (ref 25.2–33.5)
MCHC RBC AUTO-ENTMCNC: 32.7 G/DL (ref 28.4–34.8)
MCV RBC AUTO: 95.1 FL (ref 82.6–102.9)
METHADONE UR QL: NEGATIVE
MONOCYTES NFR BLD: 0.41 K/UL (ref 0.1–1.2)
MONOCYTES NFR BLD: 11 % (ref 3–12)
NEUTROPHILS NFR BLD: 58 % (ref 36–65)
NEUTS SEG NFR BLD: 2.24 K/UL (ref 1.5–8.1)
NITRITE UR QL STRIP: NEGATIVE
NRBC BLD-RTO: 0 PER 100 WBC
OPIATES UR QL SCN: NEGATIVE
OXYCODONE UR QL SCN: POSITIVE
PCP UR QL SCN: NEGATIVE
PH UR STRIP: 5.5 [PH] (ref 5–8)
PLATELET # BLD AUTO: ABNORMAL K/UL (ref 138–453)
PLATELET, FLUORESCENCE: 51 K/UL (ref 138–453)
PLATELETS.RETICULATED NFR BLD AUTO: 4.3 % (ref 1.1–10.3)
POTASSIUM SERPL-SCNC: 4 MMOL/L (ref 3.7–5.3)
PROT UR STRIP-MCNC: NEGATIVE MG/DL
RBC # BLD AUTO: 3.67 M/UL (ref 3.95–5.11)
RBC # BLD: ABNORMAL 10*6/UL
RBC #/AREA URNS HPF: NORMAL /HPF (ref 0–4)
SODIUM SERPL-SCNC: 138 MMOL/L (ref 136–145)
SP GR UR STRIP: 1.02 (ref 1–1.03)
TEST INFORMATION: ABNORMAL
UROBILINOGEN UR STRIP-ACNC: NORMAL EU/DL (ref 0–1)
WBC #/AREA URNS HPF: NORMAL /HPF (ref 0–5)
WBC OTHER # BLD: 3.9 K/UL (ref 3.5–11.3)

## 2024-09-29 PROCEDURE — 2580000003 HC RX 258

## 2024-09-29 PROCEDURE — 80048 BASIC METABOLIC PNL TOTAL CA: CPT

## 2024-09-29 PROCEDURE — 93010 ELECTROCARDIOGRAM REPORT: CPT | Performed by: INTERNAL MEDICINE

## 2024-09-29 PROCEDURE — 97166 OT EVAL MOD COMPLEX 45 MIN: CPT

## 2024-09-29 PROCEDURE — 82947 ASSAY GLUCOSE BLOOD QUANT: CPT

## 2024-09-29 PROCEDURE — 70498 CT ANGIOGRAPHY NECK: CPT

## 2024-09-29 PROCEDURE — 97535 SELF CARE MNGMENT TRAINING: CPT

## 2024-09-29 PROCEDURE — 70450 CT HEAD/BRAIN W/O DYE: CPT

## 2024-09-29 PROCEDURE — 2060000000 HC ICU INTERMEDIATE R&B

## 2024-09-29 PROCEDURE — 6370000000 HC RX 637 (ALT 250 FOR IP)

## 2024-09-29 PROCEDURE — 85025 COMPLETE CBC W/AUTO DIFF WBC: CPT

## 2024-09-29 PROCEDURE — 99232 SBSQ HOSP IP/OBS MODERATE 35: CPT | Performed by: INTERNAL MEDICINE

## 2024-09-29 PROCEDURE — 6360000004 HC RX CONTRAST MEDICATION: Performed by: PSYCHIATRY & NEUROLOGY

## 2024-09-29 PROCEDURE — 82040 ASSAY OF SERUM ALBUMIN: CPT

## 2024-09-29 PROCEDURE — 85055 RETICULATED PLATELET ASSAY: CPT

## 2024-09-29 PROCEDURE — 36415 COLL VENOUS BLD VENIPUNCTURE: CPT

## 2024-09-29 PROCEDURE — 99232 SBSQ HOSP IP/OBS MODERATE 35: CPT | Performed by: SURGERY

## 2024-09-29 RX ORDER — BUTALBITAL, ACETAMINOPHEN AND CAFFEINE 50; 325; 40 MG/1; MG/1; MG/1
1 TABLET ORAL EVERY 4 HOURS PRN
Status: DISCONTINUED | OUTPATIENT
Start: 2024-09-29 | End: 2024-10-02 | Stop reason: HOSPADM

## 2024-09-29 RX ORDER — INSULIN LISPRO 100 [IU]/ML
0-4 INJECTION, SOLUTION INTRAVENOUS; SUBCUTANEOUS NIGHTLY
Status: DISCONTINUED | OUTPATIENT
Start: 2024-09-29 | End: 2024-10-02 | Stop reason: HOSPADM

## 2024-09-29 RX ORDER — GLUCAGON 1 MG/ML
1 KIT INJECTION PRN
Status: DISCONTINUED | OUTPATIENT
Start: 2024-09-29 | End: 2024-09-30

## 2024-09-29 RX ORDER — IOPAMIDOL 755 MG/ML
75 INJECTION, SOLUTION INTRAVASCULAR
Status: COMPLETED | OUTPATIENT
Start: 2024-09-29 | End: 2024-09-29

## 2024-09-29 RX ORDER — DEXTROSE MONOHYDRATE 100 MG/ML
INJECTION, SOLUTION INTRAVENOUS CONTINUOUS PRN
Status: DISCONTINUED | OUTPATIENT
Start: 2024-09-29 | End: 2024-09-30

## 2024-09-29 RX ORDER — INSULIN LISPRO 100 [IU]/ML
0-16 INJECTION, SOLUTION INTRAVENOUS; SUBCUTANEOUS
Status: DISCONTINUED | OUTPATIENT
Start: 2024-09-29 | End: 2024-10-02 | Stop reason: HOSPADM

## 2024-09-29 RX ADMIN — ACETAMINOPHEN 1000 MG: 500 TABLET ORAL at 13:26

## 2024-09-29 RX ADMIN — PANTOPRAZOLE SODIUM 40 MG: 40 TABLET, DELAYED RELEASE ORAL at 09:00

## 2024-09-29 RX ADMIN — METHOCARBAMOL 750 MG: 750 TABLET ORAL at 09:00

## 2024-09-29 RX ADMIN — PROPRANOLOL HYDROCHLORIDE 10 MG: 10 TABLET ORAL at 09:00

## 2024-09-29 RX ADMIN — BUPROPION HYDROCHLORIDE 300 MG: 300 TABLET, EXTENDED RELEASE ORAL at 09:00

## 2024-09-29 RX ADMIN — PANTOPRAZOLE SODIUM 40 MG: 40 TABLET, DELAYED RELEASE ORAL at 16:17

## 2024-09-29 RX ADMIN — LEVOTHYROXINE SODIUM 112 MCG: 112 TABLET ORAL at 09:00

## 2024-09-29 RX ADMIN — DULOXETINE HYDROCHLORIDE 30 MG: 30 CAPSULE, DELAYED RELEASE ORAL at 09:01

## 2024-09-29 RX ADMIN — OXYCODONE 5 MG: 5 TABLET ORAL at 11:02

## 2024-09-29 RX ADMIN — METHOCARBAMOL 750 MG: 750 TABLET ORAL at 13:26

## 2024-09-29 RX ADMIN — SODIUM CHLORIDE, PRESERVATIVE FREE 10 ML: 5 INJECTION INTRAVENOUS at 21:19

## 2024-09-29 RX ADMIN — PRAZOSIN HYDROCHLORIDE 2 MG: 1 CAPSULE ORAL at 21:21

## 2024-09-29 RX ADMIN — GABAPENTIN 600 MG: 600 TABLET, FILM COATED ORAL at 21:15

## 2024-09-29 RX ADMIN — ACETAMINOPHEN 1000 MG: 500 TABLET ORAL at 06:24

## 2024-09-29 RX ADMIN — OXYCODONE 5 MG: 5 TABLET ORAL at 21:15

## 2024-09-29 RX ADMIN — ACETAMINOPHEN 1000 MG: 500 TABLET ORAL at 23:18

## 2024-09-29 RX ADMIN — IOPAMIDOL 75 ML: 755 INJECTION, SOLUTION INTRAVENOUS at 15:14

## 2024-09-29 RX ADMIN — PRAVASTATIN SODIUM 40 MG: 20 TABLET ORAL at 09:01

## 2024-09-29 RX ADMIN — METHOCARBAMOL 750 MG: 750 TABLET ORAL at 21:15

## 2024-09-29 RX ADMIN — POLYETHYLENE GLYCOL 3350 17 G: 17 POWDER, FOR SOLUTION ORAL at 09:01

## 2024-09-29 RX ADMIN — METHOCARBAMOL 750 MG: 750 TABLET ORAL at 16:17

## 2024-09-29 RX ADMIN — SODIUM CHLORIDE, PRESERVATIVE FREE 10 ML: 5 INJECTION INTRAVENOUS at 09:01

## 2024-09-29 RX ADMIN — GABAPENTIN 200 MG: 100 CAPSULE ORAL at 09:00

## 2024-09-29 RX ADMIN — BUTALBITAL, ACETAMINOPHEN, AND CAFFEINE 1 TABLET: 325; 50; 40 TABLET ORAL at 23:49

## 2024-09-29 RX ADMIN — PROPRANOLOL HYDROCHLORIDE 10 MG: 10 TABLET ORAL at 21:15

## 2024-09-29 RX ADMIN — ONDANSETRON 4 MG: 4 TABLET, ORALLY DISINTEGRATING ORAL at 11:22

## 2024-09-29 RX ADMIN — CYANOCOBALAMIN TAB 500 MCG 1000 MCG: 500 TAB at 09:00

## 2024-09-29 ASSESSMENT — PAIN SCALES - GENERAL
PAINLEVEL_OUTOF10: 10
PAINLEVEL_OUTOF10: 2
PAINLEVEL_OUTOF10: 2
PAINLEVEL_OUTOF10: 7
PAINLEVEL_OUTOF10: 10
PAINLEVEL_OUTOF10: 7
PAINLEVEL_OUTOF10: 4
PAINLEVEL_OUTOF10: 3
PAINLEVEL_OUTOF10: 7

## 2024-09-29 ASSESSMENT — PAIN DESCRIPTION - DESCRIPTORS
DESCRIPTORS: ACHING;CRUSHING
DESCRIPTORS: ACHING
DESCRIPTORS: ACHING;POUNDING;SQUEEZING
DESCRIPTORS: ACHING

## 2024-09-29 ASSESSMENT — PAIN DESCRIPTION - LOCATION
LOCATION: HEAD;NECK
LOCATION: NECK;HEAD

## 2024-09-29 ASSESSMENT — PAIN DESCRIPTION - ORIENTATION
ORIENTATION: POSTERIOR

## 2024-09-29 ASSESSMENT — PAIN - FUNCTIONAL ASSESSMENT
PAIN_FUNCTIONAL_ASSESSMENT: PREVENTS OR INTERFERES SOME ACTIVE ACTIVITIES AND ADLS
PAIN_FUNCTIONAL_ASSESSMENT: PREVENTS OR INTERFERES WITH ALL ACTIVE AND SOME PASSIVE ACTIVITIES

## 2024-09-29 ASSESSMENT — PAIN DESCRIPTION - FREQUENCY: FREQUENCY: CONTINUOUS

## 2024-09-29 ASSESSMENT — PAIN DESCRIPTION - ONSET: ONSET: ON-GOING

## 2024-09-29 NOTE — PROCEDURES
PROCEDURE NOTE  Date: 9/29/2024   Name: Paradise Fairchild  YOB: 1950    Procedures        Please complete the MRI screening form.

## 2024-09-29 NOTE — PROGRESS NOTES
Occupational Therapy  Facility/Department: [address] STEPDOWN   Occupational Therapy Initial Evaluation    Patient Name: Paradise Fairchild        MRN: [id number]    : 1950    Date of Service: 2024    Chief Complaint   Patient presents with    Fall     [facility]     Discharge Recommendations No therapy recommended at discharge.       OT Equipment Recommendations  Equipment Needed: Yes  Mobility Devices: ADL Assistive Devices  ADL Assistive Devices: Shower Chair with back    Assessment  Performance deficits / Impairments: Decreased functional mobility ;Decreased ADL status;Decreased high-level IADLs;Decreased safe awareness;Decreased cognition;Decreased endurance;Decreased balance  Prognosis: Good  Decision Making: Medium Complexity  REQUIRES OT FOLLOW-UP: Yes  Activity Tolerance  Activity Tolerance: Patient Tolerated treatment well;Patient limited by pain  Safety Devices  Type of Devices: Gait belt;Call light within reach;Left in bed;Nurse notified;Patient at risk for falls (left with transport)  Restraints  Restraints Initially in Place: No    Restrictions/Precautions  Restrictions/Precautions  Restrictions/Precautions: Fall Risk;General Precautions  Required Braces or Orthoses?: Yes (Has a AFO-unsure of which foot has drop as pt does not need it for room distances)  Position Activity Restriction  Other position/activity restrictions: up w/ A    Subjective  General  Patient assessed for rehabilitation services?: Yes  Family / Caregiver Present: Yes (Spouse Kanu)  Diagnosis: Fall, SAH, +LOC?  Subjective  Subjective: RN approved therapy session, pt states having 7/10 H/A, distraction/activity increased, session cut short d/t transport arriving early in session       Home Setup/Prior Level of Function  Social/Functional History  Lives With: Spouse  Type of Home: House  Home Layout: Multi-level (bed and bath up 7 steps)  Home Access: Stairs to enter with rails;Stairs to enter without rails  Entrance  Stairs - Number of Steps: 3  Bathroom Shower/Tub: Walk-in shower  Bathroom Toilet: Standard  Home Equipment: Cane (has [employer])  ADL Assistance: Independent  Homemaking Assistance: Independent  Homemaking Responsibilities: Yes  Ambulation Assistance: Independent  Transfer Assistance: Independent  Active : Yes  Occupation: Retired    Vision/Hearing  Vision  Vision: Impaired  Vision Exceptions: Wears glasses at all times (Pt c/o eyelid drooping (ptosis?) bilat. this date, also photophobic d/t H/A)  Hearing  Hearing: Within functional limits    BUE Assessment  Gross Assessment  AROM: Within functional limits  PROM: Within functional limits  Strength: Within functional limits  Coordination: Within functional limits  Tone: Normal  Sensation: Intact  Hand Dominance: Right     Objective  Orientation  Overall Orientation Status: Within Functional Limits  Orientation Level: Oriented X4  Cognition  Overall Cognitive Status: WFL    Activities of Daily Living  Feeding: Independent  Grooming: Independent  UE Bathing: Modified independent   LE Bathing: Modified independent   UE Dressing: Modified independent   LE Dressing: Supervision  Toileting: Supervision  Additional Comments: Pt supine in bed upon arrival, pt transferred to EOB and writer facilitated pt in donning/doffing socks, pt demo'd good BUE strength overall, func mob observed to hallway as pt left in stretcher for CT scan    Balance  Balance  Sitting:  (Pt sat unsupported on EOB/stretcher for 6 min at (I))  Standing:  (Pt stood bedside/func mob for 30 seconds at SUP)    Transfers/Mobility  Bed mobility  Supine to Sit: Supervision  Sit to Supine: Modified independent (in stretcher)    Transfers  Sit to stand: Supervision  Stand to sit: Modified independent         Functional Mobility: Supervision  Functional Mobility Skilled Clinical Factors: Slow pace, gait belt only, func mob around bed to stretcher in hallway       Patient Education  Patient  Education  Education Given To: Patient;Family (Spouse educated on tripping hazards in home)  Education Provided: Role of Therapy;Plan of Care;Fall Prevention Strategies  Education Method: Verbal  Barriers to Learning: Vision  Education Outcome: Verbalized understanding;Continued education needed    Goals  Short Term Goals  Time Frame for Short Term Goals: Pt will by d/c  Short Term Goal 1: demo good safety awareness during func mob around room at (I)  Short Term Goal 2: demo ADL UB/LB bathing/dressing activity at (I)  Short Term Goal 3: identfiy 2 non-pharm. pain-relieving techniques with 1 cue  Short Term Goal 4: demo all bed mobility, including rolling, at (I)  Short Term Goal 5: demo bending/reaching high/low func activity for 8 min+, while standing, using LRAD PRN and mod I    Plan  Occupational Therapy Plan  Times Per Week: 4x  Current Treatment Recommendations: Self-Care / ADL, Pain management, Home management training, Safety education & training, Balance training, Functional mobility training, Patient/Caregiver education & training, Endurance training, Equipment evaluation, education, & procurement    AM-[facility] Daily Activities Inpatient  AM-PAC Daily Activity - Inpatient   How much help is needed for putting on and taking off regular lower body clothing?: A Little  How much help is needed for bathing (which includes washing, rinsing, drying)?: None  How much help is needed for toileting (which includes using toilet, bedpan, or urinal)?: A Little  How much help is needed for putting on and taking off regular upper body clothing?: None  How much help is needed for taking care of personal grooming?: None  How much help for eating meals?: None  AM-[facility] Inpatient Daily Activity Raw Score: 22  AM-PAC Inpatient ADL T-Scale Score : 47.1  ADL Inpatient CMS 0-100% Score: 25.8  ADL Inpatient CMS G-Code Modifier : CJ    Minutes  OT Individual Minutes  Time In: 1030  Time Out: 1041  Minutes: 11  Time Code Minutes   Timed

## 2024-09-29 NOTE — PROGRESS NOTES
[facility]  Office: [phone]  Barry Moe DO, Everett Cohen, DO, Abdulaziz Colindres DO, Prashant Benitez, DO, Steffanie Escamilla MD, Margarita Keene MD, Eliza Yang MD, Tatyana Swain MD,  Kin Simental MD, Willie Gonzalez MD, Marii Pedroza MD,  Jerman Soriano DO, Porfirio Wilder MD, Steven Rodriges MD, Duane Moe DO, Funmi Reyez MD,  Gerard Franco DO, Kelli Garibay MD, Chrissie Martinez MD, Lou Zhu MD, Margarita Corbin MD,  Rick Cooley MD, Sabi Conti MD, Geeta Lopes MD, Richelle Marie MD, Hang Helm MD, Nurys Monson MD, Yoel aRnd, DO, Jaylen Harrington DO, Saúl Olmos DO, Ayan Badillo MD, Shirley Waterhouse, CNP,  Jennyfer Mcallister CNP, Yoel Mitchell, CNP,  Niya Roldan, DNP, Camille Cabral, CNP, Lavinia Brown, CNP, Geni Wilkerson, CNP, Saira Reynolds, CNP, Ailyn Mcintyre, PA-C, Beryl Mcmahan PA-C, Mary Anne Kaur, CNP, Sandy Sanchez, CNP,  Madhuri Arreola, CNP, Jeny Goldman, CNP, Nelly Wu, CNP, Serena Collazo, CNS, Antionette Almaguer, CNP, Negra Barber, CNP, Tracy Schwab, CNP         IN-PATIENT SERVICE  [facility]    Progress Note    9/29/2024    11:56 AM    Name:   Paradise Fairchild  MRN:     [id number]     Acct:      [id number]   Room:   0139/0139-01   Day:  1  Admit Date:  9/28/2024 12:36 AM    PCP:   Albaro Rodriges DO  Code Status:  Full Code    Subjective:     C/C:   Chief Complaint   Patient presents with    Fall     [facility]     Interval History Status:   Improved  Minimal headache  No visual change  No paralysis or paresis    The patient still does not have a clear account of the circumstances leading to her admission  She had been found down  Patient does have a history of falls which have been attributed to foot drop    Data Base Updates:  WBC3.9k/uL RBC3.67 Low m/uL Eiganpxrbs28.4 Low   Platelet, Bqlvphrctqjb16 Low     Wzupoe604lmdv/L Potassium4.0mmol/L Anssibdh716btag/L RM100lugd/L Anion Gap8 Low mmol/L  12:51 AM     No results found for: \"SPECIAL\"  No results found for: \"CULTURE\"    Radiology:  CT HEAD WO CONTRAST    Result Date: 9/29/2024  Thin linear hyperdense focus immediately adjacent to the posterior aspect of the falx, which appears to extend into the right sub parietal sulcus. Findings may represent a small subarachnoid hemorrhage.  Recommend attention on follow-up.     CT CHEST ABDOMEN PELVIS W CONTRAST Additional Contrast? None    Result Date: 9/28/2024  1. No acute traumatic injury of the chest, abdomen, or pelvis. 2. No acute osseous abnormality of the thoracic or lumbar spine. 3. Cirrhosis with moderate splenomegaly and small volume of ascites.     CT THORACIC SPINE BONY RECONSTRUCTION    Result Date: 9/28/2024  1. No acute traumatic injury of the chest, abdomen, or pelvis. 2. No acute osseous abnormality of the thoracic or lumbar spine. 3. Cirrhosis with moderate splenomegaly and small volume of ascites.     CT LUMBAR SPINE BONY RECONSTRUCTION    Result Date: 9/28/2024  1. No acute traumatic injury of the chest, abdomen, or pelvis. 2. No acute osseous abnormality of the thoracic or lumbar spine. 3. Cirrhosis with moderate splenomegaly and small volume of ascites.       Assessment:        Primary Problem  Subarachnoid bleed (HCC)    Active Hospital Problems    Diagnosis Date Noted    Thrombocytopenia (HCC) [D69.6] 09/29/2024    Depression [F32.A] 09/29/2024    Acquired hypothyroidism [E03.9] 09/29/2024    GERD (gastroesophageal reflux disease) [K21.9] 09/29/2024    Fibromyalgia [M79.7] 09/29/2024    Sjogren's disease (HCC) [M35.00] 09/29/2024    Tremor [R25.1] 09/29/2024    Syncope [R55] 09/29/2024    Subarachnoid bleed (HCC) [I60.9] 09/28/2024    Fall [W19.XXXA] 09/28/2024         Plan:        Trauma eval  Neuro eval  EEG  MRI pending  Echo ordered  Suggest outpatient Holter monitoring  Pain management  Fall precautions  PT/OT  Reflux precautions  Trend platelets  DVT prophylaxis    IP CONSULT TO

## 2024-09-29 NOTE — PROGRESS NOTES
General Neurology Progress Note  [facility]            Date:   9/29/2024  Patient name:  Paradise Fairchild  Date of admission:  9/28/2024 12:36 AM  MRN:   [id number]  Account:  [id number]  YOB: 1950  PCP:    Albaro Rodriges DO  Room:   [facility]/0139-01  Code Status:    Full Code  Chief Complaint:   Chief Complaint   Patient presents with    Fall     [facility]     Allergies: Nsaids  Interval history      Patient was seen and examined at bedside this morning. Labs, chart, and VS reviewed. No acute overnight events.     Review of Systems   Constitutional:  Negative for activity change, appetite change, chills, diaphoresis, fatigue and fever.   HENT:  Negative for congestion, drooling, rhinorrhea, sore throat, trouble swallowing and voice change.    Eyes:  Negative for photophobia, pain, discharge, redness, itching and visual disturbance.   Respiratory:  Negative for cough, choking, chest tightness, shortness of breath and wheezing.    Cardiovascular:  Negative for chest pain, palpitations and leg swelling.   Gastrointestinal:  Negative for abdominal distention, abdominal pain, constipation, diarrhea and nausea.   Endocrine: Negative for polydipsia, polyphagia and polyuria.   Genitourinary:  Negative for difficulty urinating and dysuria.   Musculoskeletal:  Negative for arthralgias, back pain, gait problem, joint swelling, myalgias and neck pain.   Neurological:  Positive for light-headedness. Negative for dizziness, seizures, syncope, facial asymmetry, speech difficulty, weakness, numbness and headaches.   Psychiatric/Behavioral:  Negative for agitation, behavioral problems, confusion, decreased concentration, dysphoric mood, hallucinations and sleep disturbance. The patient is not nervous/anxious and is not hyperactive.      Neurological Exam   BP (!) 96/55   Pulse 56   Temp 97.8 °F (36.6 °C) (Oral)   Resp 17   Ht 1.575 m (5' 2\")   Wt 64.4 kg (142 lb)   SpO2 99%

## 2024-09-29 NOTE — PLAN OF CARE
Problem: Discharge Planning  Goal: Discharge to home or other facility with appropriate resources  Outcome: Progressing     Problem: Pain  Goal: Verbalizes/displays adequate comfort level or baseline comfort level  Outcome: Progressing     Problem: Skin/Tissue Integrity  Goal: Absence of new skin breakdown  Description: 1.  Monitor for areas of redness and/or skin breakdown  2.  Assess vascular access sites hourly  3.  Every 4-6 hours minimum:  Change oxygen saturation probe site  4.  Every 4-6 hours:  If on nasal continuous positive airway pressure, respiratory therapy assess nares and determine need for appliance change or resting period.  Outcome: Progressing     Problem: Safety - Adult  Goal: Free from fall injury  Outcome: Progressing  Flowsheets (Taken 9/28/2024 2000)  Free From Fall Injury: Instruct family/caregiver on patient safety

## 2024-09-29 NOTE — PROGRESS NOTES
PROGRESS NOTE          PATIENT NAME: Paradise Fairchild  MEDICAL RECORD NO. [id number]  DATE: 9/29/2024    HD: # 1      DIAGNOSIS AND PLAN    Subarachnoid bleed, BIG 2   Continue to monitor neuro status  Neurology consulted due to concern for syncope/seizure-appreciate assistance  Will need PT/OT when ambulation is safe  Geriatrics following due to positive frailty  DVT Prophylaxis-held in the setting of intracranial bleed      Chief Complaint: \"I am doing okay today\"    SUBJECTIVE    Patient seen and examined at the bedside.  No acute events reported overnight.  Denies headache.  Denies bowel movement.  Eating regular diet without issue.    OBJECTIVE  VITALS:   Vitals:    09/29/24 0411   BP: (!) 97/52   Pulse: 62   Resp: 10   Temp: 98.6 °F (37 °C)   SpO2: 94%     Physical Exam  Constitutional:       Appearance: Normal appearance.   HENT:      Head: Normocephalic and atraumatic.      Right Ear: External ear normal.      Left Ear: External ear normal.      Nose: Nose normal.      Mouth/Throat:      Mouth: Mucous membranes are moist.   Eyes:      Extraocular Movements: Extraocular movements intact.   Cardiovascular:      Rate and Rhythm: Normal rate and regular rhythm.      Pulses: Normal pulses.   Pulmonary:      Effort: Pulmonary effort is normal.   Abdominal:      General: Abdomen is flat.      Palpations: Abdomen is soft.   Musculoskeletal:         General: Normal range of motion.      Cervical back: Normal range of motion.   Skin:     General: Skin is warm and dry.      Capillary Refill: Capillary refill takes less than 2 seconds.   Neurological:      General: No focal deficit present.      Mental Status: She is alert.      GCS: GCS eye subscore is 4. GCS verbal subscore is 5. GCS motor subscore is 6.   Psychiatric:         Mood and Affect: Mood normal.         Behavior: Behavior normal.           Drain/tube output:    No intake/output data recorded.  No intake/output data recorded.        LAB:  CBC:   Recent Labs

## 2024-09-29 NOTE — PLAN OF CARE
Problem: Discharge Planning  Goal: Discharge to home or other facility with appropriate resources  9/29/2024 1734 by Christa Winter RN  Outcome: Progressing  Flowsheets (Taken 9/29/2024 0800)  Discharge to home or other facility with appropriate resources:   Identify barriers to discharge with patient and caregiver   Identify discharge learning needs (meds, wound care, etc)   Arrange for needed discharge resources and transportation as appropriate   Refer to discharge planning if patient needs post-hospital services based on physician order or complex needs related to functional status, cognitive ability or social support system  9/29/2024 0651 by Marleni Munguia, RN  Outcome: Progressing     Problem: Pain  Goal: Verbalizes/displays adequate comfort level or baseline comfort level  9/29/2024 1734 by Christa Winter RN  Outcome: Progressing  9/29/2024 0651 by Marleni Munguia, RN  Outcome: Progressing     Problem: Skin/Tissue Integrity  Goal: Absence of new skin breakdown  Description: 1.  Monitor for areas of redness and/or skin breakdown  2.  Assess vascular access sites hourly  3.  Every 4-6 hours minimum:  Change oxygen saturation probe site  4.  Every 4-6 hours:  If on nasal continuous positive airway pressure, respiratory therapy assess nares and determine need for appliance change or resting period.  9/29/2024 1734 by Christa Winter RN  Outcome: Progressing  9/29/2024 0651 by Marleni Munguia, RN  Outcome: Progressing     Problem: Safety - Adult  Goal: Free from fall injury  9/29/2024 1734 by Christa Winter RN  Outcome: Progressing  Flowsheets (Taken 9/29/2024 0722)  Free From Fall Injury: Instruct family/caregiver on patient safety  9/29/2024 0651 by Marleni Munguia, RN  Outcome: Progressing  Flowsheets (Taken 9/28/2024 2000)  Free From Fall Injury: Instruct family/caregiver on patient safety

## 2024-09-30 ENCOUNTER — APPOINTMENT (OUTPATIENT)
Dept: MRI IMAGING | Age: 74
End: 2024-09-30
Payer: MEDICARE

## 2024-09-30 ENCOUNTER — APPOINTMENT (OUTPATIENT)
Age: 74
End: 2024-09-30
Attending: INTERNAL MEDICINE
Payer: MEDICARE

## 2024-09-30 PROBLEM — E11.65 UNCONTROLLED TYPE 2 DIABETES MELLITUS WITH HYPERGLYCEMIA (HCC): Status: ACTIVE | Noted: 2024-09-30

## 2024-09-30 LAB
ANION GAP SERPL CALCULATED.3IONS-SCNC: 9 MMOL/L (ref 9–16)
BASOPHILS # BLD: 0.03 K/UL (ref 0–0.2)
BASOPHILS NFR BLD: 1 % (ref 0–2)
BUN SERPL-MCNC: 14 MG/DL (ref 8–23)
CALCIUM SERPL-MCNC: 8.1 MG/DL (ref 8.6–10.4)
CHLORIDE SERPL-SCNC: 105 MMOL/L (ref 98–107)
CO2 SERPL-SCNC: 22 MMOL/L (ref 20–31)
CREAT SERPL-MCNC: 1.1 MG/DL (ref 0.5–0.9)
EOSINOPHIL # BLD: 0.2 K/UL (ref 0–0.44)
EOSINOPHILS RELATIVE PERCENT: 5 % (ref 1–4)
ERYTHROCYTE [DISTWIDTH] IN BLOOD BY AUTOMATED COUNT: 14.6 % (ref 11.8–14.4)
GFR, ESTIMATED: 52 ML/MIN/1.73M2
GLUCOSE BLD-MCNC: 227 MG/DL (ref 65–105)
GLUCOSE BLD-MCNC: 230 MG/DL (ref 65–105)
GLUCOSE BLD-MCNC: 265 MG/DL (ref 65–105)
GLUCOSE BLD-MCNC: 98 MG/DL (ref 65–105)
GLUCOSE SERPL-MCNC: 140 MG/DL (ref 74–99)
HCT VFR BLD AUTO: 35.3 % (ref 36.3–47.1)
HGB BLD-MCNC: 10.9 G/DL (ref 11.9–15.1)
IMM GRANULOCYTES # BLD AUTO: <0.03 K/UL (ref 0–0.3)
IMM GRANULOCYTES NFR BLD: 1 %
LYMPHOCYTES NFR BLD: 1.2 K/UL (ref 1.1–3.7)
LYMPHOCYTES RELATIVE PERCENT: 28 % (ref 24–43)
MCH RBC QN AUTO: 31.3 PG (ref 25.2–33.5)
MCHC RBC AUTO-ENTMCNC: 30.9 G/DL (ref 28.4–34.8)
MCV RBC AUTO: 101.4 FL (ref 82.6–102.9)
MONOCYTES NFR BLD: 0.46 K/UL (ref 0.1–1.2)
MONOCYTES NFR BLD: 11 % (ref 3–12)
NEUTROPHILS NFR BLD: 56 % (ref 36–65)
NEUTS SEG NFR BLD: 2.43 K/UL (ref 1.5–8.1)
NRBC BLD-RTO: 0 PER 100 WBC
PLATELET # BLD AUTO: 50 K/UL (ref 138–453)
PMV BLD AUTO: 11.1 FL (ref 8.1–13.5)
POTASSIUM SERPL-SCNC: 4.6 MMOL/L (ref 3.7–5.3)
RBC # BLD AUTO: 3.48 M/UL (ref 3.95–5.11)
RBC # BLD: ABNORMAL 10*6/UL
SODIUM SERPL-SCNC: 136 MMOL/L (ref 136–145)
WBC OTHER # BLD: 4.3 K/UL (ref 3.5–11.3)

## 2024-09-30 PROCEDURE — 6370000000 HC RX 637 (ALT 250 FOR IP)

## 2024-09-30 PROCEDURE — 80048 BASIC METABOLIC PNL TOTAL CA: CPT

## 2024-09-30 PROCEDURE — 36415 COLL VENOUS BLD VENIPUNCTURE: CPT

## 2024-09-30 PROCEDURE — 99231 SBSQ HOSP IP/OBS SF/LOW 25: CPT | Performed by: SURGERY

## 2024-09-30 PROCEDURE — 6360000004 HC RX CONTRAST MEDICATION: Performed by: PSYCHIATRY & NEUROLOGY

## 2024-09-30 PROCEDURE — 6360000002 HC RX W HCPCS

## 2024-09-30 PROCEDURE — 97161 PT EVAL LOW COMPLEX 20 MIN: CPT

## 2024-09-30 PROCEDURE — 97116 GAIT TRAINING THERAPY: CPT

## 2024-09-30 PROCEDURE — 2060000000 HC ICU INTERMEDIATE R&B

## 2024-09-30 PROCEDURE — A9576 INJ PROHANCE MULTIPACK: HCPCS | Performed by: PSYCHIATRY & NEUROLOGY

## 2024-09-30 PROCEDURE — 70553 MRI BRAIN STEM W/O & W/DYE: CPT

## 2024-09-30 PROCEDURE — 97112 NEUROMUSCULAR REEDUCATION: CPT

## 2024-09-30 PROCEDURE — 85025 COMPLETE CBC W/AUTO DIFF WBC: CPT

## 2024-09-30 PROCEDURE — 99232 SBSQ HOSP IP/OBS MODERATE 35: CPT | Performed by: INTERNAL MEDICINE

## 2024-09-30 PROCEDURE — 82947 ASSAY GLUCOSE BLOOD QUANT: CPT

## 2024-09-30 RX ORDER — HEPARIN SODIUM 5000 [USP'U]/ML
5000 INJECTION, SOLUTION INTRAVENOUS; SUBCUTANEOUS EVERY 8 HOURS SCHEDULED
Status: DISCONTINUED | OUTPATIENT
Start: 2024-09-30 | End: 2024-10-02 | Stop reason: HOSPADM

## 2024-09-30 RX ORDER — POLYETHYLENE GLYCOL 3350 17 G/17G
17 POWDER, FOR SOLUTION ORAL 2 TIMES DAILY
Status: DISCONTINUED | OUTPATIENT
Start: 2024-09-30 | End: 2024-10-02 | Stop reason: HOSPADM

## 2024-09-30 RX ADMIN — INSULIN LISPRO 4 UNITS: 100 INJECTION, SOLUTION INTRAVENOUS; SUBCUTANEOUS at 17:51

## 2024-09-30 RX ADMIN — PROPRANOLOL HYDROCHLORIDE 10 MG: 10 TABLET ORAL at 09:13

## 2024-09-30 RX ADMIN — OXYCODONE 5 MG: 5 TABLET ORAL at 20:06

## 2024-09-30 RX ADMIN — LEVOTHYROXINE SODIUM 112 MCG: 112 TABLET ORAL at 09:13

## 2024-09-30 RX ADMIN — ONDANSETRON 4 MG: 2 INJECTION INTRAMUSCULAR; INTRAVENOUS at 12:41

## 2024-09-30 RX ADMIN — ACETAMINOPHEN 1000 MG: 500 TABLET ORAL at 15:58

## 2024-09-30 RX ADMIN — INSULIN LISPRO 8 UNITS: 100 INJECTION, SOLUTION INTRAVENOUS; SUBCUTANEOUS at 12:00

## 2024-09-30 RX ADMIN — OXYCODONE 5 MG: 5 TABLET ORAL at 12:47

## 2024-09-30 RX ADMIN — BUPROPION HYDROCHLORIDE 300 MG: 300 TABLET, EXTENDED RELEASE ORAL at 09:14

## 2024-09-30 RX ADMIN — DULOXETINE HYDROCHLORIDE 30 MG: 30 CAPSULE, DELAYED RELEASE ORAL at 09:13

## 2024-09-30 RX ADMIN — PROPRANOLOL HYDROCHLORIDE 10 MG: 10 TABLET ORAL at 20:06

## 2024-09-30 RX ADMIN — GABAPENTIN 600 MG: 600 TABLET, FILM COATED ORAL at 20:06

## 2024-09-30 RX ADMIN — PRAZOSIN HYDROCHLORIDE 2 MG: 1 CAPSULE ORAL at 20:06

## 2024-09-30 RX ADMIN — POLYETHYLENE GLYCOL 3350 17 G: 17 POWDER, FOR SOLUTION ORAL at 09:13

## 2024-09-30 RX ADMIN — GABAPENTIN 200 MG: 100 CAPSULE ORAL at 09:13

## 2024-09-30 RX ADMIN — INSULIN LISPRO 4 UNITS: 100 INJECTION, SOLUTION INTRAVENOUS; SUBCUTANEOUS at 09:56

## 2024-09-30 RX ADMIN — PANTOPRAZOLE SODIUM 40 MG: 40 TABLET, DELAYED RELEASE ORAL at 09:13

## 2024-09-30 RX ADMIN — BUTALBITAL, ACETAMINOPHEN, AND CAFFEINE 1 TABLET: 325; 50; 40 TABLET ORAL at 09:13

## 2024-09-30 RX ADMIN — ACETAMINOPHEN 1000 MG: 500 TABLET ORAL at 06:29

## 2024-09-30 RX ADMIN — GADOTERIDOL 12 ML: 279.3 INJECTION, SOLUTION INTRAVENOUS at 14:15

## 2024-09-30 RX ADMIN — CYANOCOBALAMIN TAB 500 MCG 1000 MCG: 500 TAB at 09:13

## 2024-09-30 RX ADMIN — PRAVASTATIN SODIUM 40 MG: 20 TABLET ORAL at 09:13

## 2024-09-30 RX ADMIN — PANTOPRAZOLE SODIUM 40 MG: 40 TABLET, DELAYED RELEASE ORAL at 15:58

## 2024-09-30 ASSESSMENT — PAIN - FUNCTIONAL ASSESSMENT: PAIN_FUNCTIONAL_ASSESSMENT: PREVENTS OR INTERFERES SOME ACTIVE ACTIVITIES AND ADLS

## 2024-09-30 ASSESSMENT — PAIN SCALES - GENERAL
PAINLEVEL_OUTOF10: 3
PAINLEVEL_OUTOF10: 3
PAINLEVEL_OUTOF10: 1
PAINLEVEL_OUTOF10: 7
PAINLEVEL_OUTOF10: 1
PAINLEVEL_OUTOF10: 5
PAINLEVEL_OUTOF10: 4
PAINLEVEL_OUTOF10: 3
PAINLEVEL_OUTOF10: 0
PAINLEVEL_OUTOF10: 2

## 2024-09-30 ASSESSMENT — ENCOUNTER SYMPTOMS
SHORTNESS OF BREATH: 0
COUGH: 0
ABDOMINAL PAIN: 0
NAUSEA: 0
VOMITING: 0

## 2024-09-30 ASSESSMENT — PAIN DESCRIPTION - DESCRIPTORS
DESCRIPTORS: CRUSHING;SHARP;SHOOTING
DESCRIPTORS: ACHING
DESCRIPTORS: CRUSHING;ACHING
DESCRIPTORS: DISCOMFORT;CRAMPING;CRUSHING

## 2024-09-30 ASSESSMENT — PAIN DESCRIPTION - LOCATION
LOCATION: HEAD;NECK
LOCATION: BACK;NECK
LOCATION: NECK;BACK
LOCATION: HEAD
LOCATION: HEAD

## 2024-09-30 ASSESSMENT — PAIN DESCRIPTION - ORIENTATION
ORIENTATION: MID;POSTERIOR
ORIENTATION: UPPER;POSTERIOR
ORIENTATION: MID;POSTERIOR
ORIENTATION: POSTERIOR
ORIENTATION: MID;POSTERIOR

## 2024-09-30 ASSESSMENT — PAIN DESCRIPTION - PAIN TYPE: TYPE: ACUTE PAIN

## 2024-09-30 ASSESSMENT — PAIN SCALES - WONG BAKER
WONGBAKER_NUMERICALRESPONSE: NO HURT

## 2024-09-30 NOTE — PROGRESS NOTES
PROGRESS NOTE    PATIENT NAME: Paradise Faicrhild  MEDICAL RECORD NO. [id number]  DATE: 9/30/2024    HD: # 2    DIAGNOSIS AND PLAN    Subarachnoid bleed, BIG 2   Continue to monitor neuro status  Neurology consulted due to concern for syncope/seizure-appreciate assistance  Pending MRI and echo at this time for syncopal workup, we will follow-up  Will need PT/OT when ambulation is safe  Geriatrics following due to positive frailty  DVT Prophylaxis-held in the setting of intracranial bleed    Chief Complaint: \"I am doing okay today\"    SUBJECTIVE    Patient seen and examined at the bedside.  No acute events reported overnight.  Patient reports having a headache overnight and was given Fioricet, which improved her pain.  Patient is scheduled for an MRI and echo at this time.  Denies bowel movement.  Eating regular diet without issue.    OBJECTIVE  VITALS:   Vitals:    09/30/24 0744   BP: 110/68   Pulse: 59   Resp: 11   Temp: 97.9 °F (36.6 °C)   SpO2: 92%     Physical Exam  Constitutional:       Appearance: Normal appearance.   HENT:      Head: Normocephalic and atraumatic.      Right Ear: External ear normal.      Left Ear: External ear normal.      Nose: Nose normal.      Mouth/Throat:      Mouth: Mucous membranes are moist.   Eyes:      Extraocular Movements: Extraocular movements intact.   Cardiovascular:      Rate and Rhythm: Normal rate and regular rhythm.      Pulses: Normal pulses.   Pulmonary:      Effort: Pulmonary effort is normal.   Abdominal:      General: Abdomen is flat.      Palpations: Abdomen is soft.   Musculoskeletal:         General: Normal range of motion.      Cervical back: Normal range of motion.   Skin:     General: Skin is warm and dry.      Capillary Refill: Capillary refill takes less than 2 seconds.   Neurological:      General: No focal deficit present.      Mental Status: She is alert.      GCS: GCS eye subscore is 4. GCS verbal subscore is 5. GCS motor subscore is 6.   Psychiatric:

## 2024-09-30 NOTE — PROGRESS NOTES
Physical Therapy  Facility/Department: [address] STEPDOWN  Physical Therapy Initial Assessment    Name: Paradise Fairchild  : 1950  MRN: [id number]  Date of Service: 2024  Chief Complaint   Patient presents with    Fall     [facility]       Discharge Recommendations:  Patient would benefit from continued therapy after discharge   PT Equipment Recommendations  Equipment Needed: No      Patient Diagnosis(es): The primary encounter diagnosis was Fall, initial encounter. A diagnosis of Syncope, unspecified syncope type was also pertinent to this visit.  Past Medical History:  has no past medical history on file.  Past Surgical History:  has no past surgical history on file.    Assessment  Body Structures, Functions, Activity Limitations Requiring Skilled Therapeutic Intervention: Decreased functional mobility ;Decreased balance  Assessment: Mild balance impairment but patient was able to ambulate 200' without a device.    The foot drop that she sometimes uses an AFO for is not observed during strength testing and gait.Recommend no device use at home in her current condition.  Will continue PT while admitted.  Therapy Prognosis: Good  Decision Making: Low Complexity  Clinical Presentation: stable  Requires PT Follow-Up: Yes    Plan  Physical Therapy Plan  General Plan:  (5-6x/wk)  Current Treatment Recommendations: Strengthening, Gait training, Stair training, Functional mobility training, Transfer training, Patient/Caregiver education & training, Safety education & training, Home exercise program, Therapeutic activities  Safety Devices  Type of Devices: Gait belt, Call light within reach, Left in bed, Nurse notified, All fall risk precautions in place  Restraints  Restraints Initially in Place: No    Restrictions  Restrictions/Precautions  Restrictions/Precautions: Fall Risk  Required Braces or Orthoses?: Yes (Has a AFO-unsure of which foot has drop as pt does not need it for room distances)  Position

## 2024-09-30 NOTE — CARE COORDINATION
Trauma Coordinator Rounding Note  Daily check in:  Pt was off unit at MRI during my visit.   DC Expectation:  Patient expects to be discharged to - unknown at this time. May need SNF or ARU depending on pt progress.   Bedside Nurse:  I spoke with the patient's assigned nurse to review the plan of care for today and provide an opportunity to ask questions or relay any concerns to the Trauma service.    Discharge Info:  I confirmed follow up information was placed in the discharge instructions for  trauma surgery .   Discharge instructions reviewed and updated in patient's chart.   :  I confirmed dispo by reading CM note. Current barriers to discharge include: not medically stable for discharge due to continued medical monitoring needed.  Pt prefers to go home but may need SNF or ARU. Depends on pt progress. Will continue to follow.   Electronically signed by Elaine Coburn RN on 9/30/2024 at 1:50 PM

## 2024-09-30 NOTE — PROGRESS NOTES
[facility] Progress [facility]  PADMINI Beard   9/30/2024    Paradise Fairchild  1950  [id number]      Time spent with Patient: 0 minutes  Time spent with Family: 0 Minutes    This writer was unable to complete screen or therapy services with patient at bedside at this time due to the following:  Patient requested clinician return later.  Pt on phone.  Therapist to return later.

## 2024-09-30 NOTE — PROGRESS NOTES
[facility]  Office: [phone]  Barry Moe, DO, Everett Cohen, DO, Abdulaziz Colindres DO, Prashant Benitez, DO, Steffanie Escamilla MD, Margarita Keene MD, Eliza Yang MD, Tatyana Swain MD,  Kin Simental MD, Willie Gonzalez MD, Marii Pedroza MD,  Jerman Soriano DO, Porfirio Wilder MD, Steven Rodriges MD, Duane Moe DO, Funmi Reyez MD,  Gerard Franco DO, Kelli Garibay MD, Chrissie Martinez MD, Lou Zhu MD, Margarita Corbin MD,  Rick Cooley MD, Sabi Conti MD, Geeta Lopes MD, Richelle Marie MD, Hang Helm MD, Nurys Monson MD, Yoel Rand, DO, Jaylen Harrington DO, Saúl Olmos DO, Ayan Badillo MD, Shirley Waterhouse, CNP,  Jennyfer Mcallister CNP, Yoel Mitchell, CNP,  Niya Rlodan, DNP, Camille Cabral, CNP, Lavinia Brown, CNP, Geni Wilkerson, CNP, Saira Reynolds, CNP, Ailyn Mcintyre, PA-C, Beryl Mcmahan PA-C, Mary Anne Kaur, CNP, Sandy Sanchez, CNP,  Madhuri Arreola, CNP, Jeny Goldman, CNP, Nelly Wu, CNP, Serena Collazo, CNS, Antionette Almaguer, CNP, Negra Barber, CNP, Tracy Schwab, CNP         IN-PATIENT SERVICE  [facility]    Progress Note    9/30/2024    3:57 PM    Name:   Paradise Fairchild  MRN:     [id number]     Acct:      [id number]   Room:   0139/0139-01  IP Day:  2  Admit Date:  9/28/2024 12:36 AM    PCP:   Albaro Rodriges DO  Code Status:  Full Code    Subjective:     C/C:   Chief Complaint   Patient presents with    Fall     Josue Gerlach tx     Interval History Status:   Improved  Still reporting occipital headache   No visual change  No paralysis or paresis  Chronic right foot drop    Data Base Updates:  WBC4.3k/uL RBC3.48 Low m/uL Kbejczxmow80.9 Low   Iswtnmpde93 Low     Odibdk249cflk/L Potassium4.6mmol/L   Comment: SPECIMEN SLIGHTLY HEMOLYZED, RESULTS MAY BE ADVERSELY AFFECTED.   Yicodmxc084javl/L RG649pwam/L Anion Ifw1fova/L     Blood sugar still somewhat labile  Qdurbad698 High mg/dL     NYT75eg/dL Creatinine1.1 High        Physical Exam  Vitals reviewed.   Constitutional:       General: She is not in acute distress.     Appearance: She is not diaphoretic.   HENT:      Head: Normocephalic.      Nose: Nose normal.   Eyes:      General: No scleral icterus.     Conjunctiva/sclera: Conjunctivae normal.   Neck:      Trachea: No tracheal deviation.   Cardiovascular:      Rate and Rhythm: Normal rate and regular rhythm.   Pulmonary:      Effort: Pulmonary effort is normal. No respiratory distress.      Breath sounds: Normal breath sounds. No wheezing or rales.   Chest:      Chest wall: No tenderness.   Abdominal:      General: There is no distension.      Palpations: Abdomen is soft.      Tenderness: There is no abdominal tenderness.   Musculoskeletal:         General: No tenderness.      Cervical back: Neck supple.   Skin:     General: Skin is warm and dry.   Neurological:      Mental Status: She is alert and oriented to person, place, and time.      Motor: Weakness (Chronic foot drop) present.         Medications:     Allergies:    Allergies   Allergen Reactions    Nsaids        Current Meds:   Scheduled Meds:    polyethylene glycol  17 g Oral BID    [Held by provider] heparin (porcine)  5,000 Units SubCUTAneous 3 times per day    insulin lispro  0-16 Units SubCUTAneous TID WC    insulin lispro  0-4 Units SubCUTAneous Nightly    buPROPion  300 mg Oral Daily    DULoxetine  30 mg Oral Daily    gabapentin  200 mg Oral QAM    gabapentin  600 mg Oral Nightly    levothyroxine  112 mcg Oral Daily    pantoprazole  40 mg Oral BID AC    pravastatin  40 mg Oral Daily    prazosin  2 mg Oral Nightly    propranolol  10 mg Oral BID    vitamin B-12  1,000 mcg Oral QAM    acetaminophen  1,000 mg Oral 3 times per day    lidocaine  1 patch TransDERmal Daily     Continuous Infusions:       PRN Meds: glucose, butalbital-acetaminophen-caffeine, sodium chloride flush, ondansetron **OR** ondansetron, oxyCODONE    Data:     I/O (24Hr):    Intake/Output

## 2024-09-30 NOTE — CONSULTS
[facility] Inpatient Psychotherapy Note  PADMINI Beard   9/30/2024  4:03 PM  Paradise Fairchild  1950  [id number]      Time Spent with Patient: 16 to 37 minutes (30 minutes) 72008    Pt was provided informed consent for the [facility]. Discussed with patient model of service to include the limits of confidentiality (i.e. abuse reporting, suicide intervention, etc.) and short-term intervention focused approach.  Pt indicated understanding.    [facility] Inpatient or Virtual Question: This was not a virtual session    Is consult a Victim of Crime?: No    Presenting Patient Report:  Patient was screened at the request of the Trauma Team.   Patient presents as a 74 y.o. female subsequent to hospital admission following a fall resulting in injury.     Screens deferred due to pt's current low mood and pt engaged in therapy session.    Pt reports significant hx of trauma and expressed how her trauma is still affecting her now.  Pt also reports \"feeling depressed all the time.\"  Pt denies any suicidal ideation and reports she \"would never do that.\"  Pt was tearful during session and processed some past experiences.  Therapist led pt in processing emotions, thoughts, and experiences.  Therapist offered psychoeducation, [facility] Info Packet, and emotional support.  Therapist led pt in developing coping skills.  Pt has been in therapy in the past but is not in therapy at this time.  Pt is interested in outpatient therapy referrals.  Pt also interested in bedside therapy during stay.   Therapist will follow up tomorrow morning and provide referrals.      MSE:     Appearance:   Hospital Gown, Lethargy, and Good Eye Contact  Speech    spontaneous, normal rate, normal volume, and well articulated  Affect Observed   Depressed  Thought Content    excessive guilt and cognitive distortions  Thought Process    linear, goal directed, and coherent  Associations    logical connections  Insight    Good  Judgment

## 2024-09-30 NOTE — DISCHARGE INSTR - COC
Continuity of Care Form    Patient Name: Paradise Fairchild   :  1950  MRN:  [id number]    Admit date:  2024  Discharge date:  ***    Code Status Order: Full Code   Advance Directives:   Advance Care Flowsheet Documentation             Admitting Physician:  Yanick Prieto MD  PCP: Albaro Rodriges DO    Discharging Nurse: ***  Discharging Hospital Unit/Room#: 0139/0139-01  Discharging Unit Phone Number: ***    Emergency Contact:   Extended Emergency Contact Information  Primary Emergency Contact: Kanu Fairchild  Mobile Phone: [phone]  Relation: Spouse    Past Surgical History:  History reviewed. No pertinent surgical history.    Immunization History:     There is no immunization history on file for this patient.    Active Problems:  Patient Active Problem List   Diagnosis Code    Subarachnoid bleed (HCC) I60.9    Fall W19.XXXA    Thrombocytopenia ([facility]) D69.6    Depression F32.A    Acquired hypothyroidism E03.9    GERD (gastroesophageal reflux disease) K21.9    Fibromyalgia M79.7    Sjogren's disease ([facility]) M35.00    Tremor R25.1    Syncope R55       Isolation/Infection:   Isolation            No Isolation          Patient Infection Status       None to display            Nurse Assessment:  Last Vital Signs: BP (!) 116/56   Pulse 56   Temp 97.8 °F (36.6 °C) (Oral)   Resp 12   Ht 1.575 m (5' 2\")   Wt 64.4 kg (142 lb)   SpO2 95%   BMI 25.97 kg/m²     Last documented pain score (0-10 scale): Pain Level: 7  Last Weight:   Wt Readings from Last 1 Encounters:   24 64.4 kg (142 lb)     Mental Status:  {IP PT MENTAL STATUS:}    IV Access:  { JOHN IV ACCESS:449904099}    Nursing Mobility/ADLs:  Walking   {CHP DME ADLs:895509783}  Transfer  {CHP DME ADLs:743432738}  Bathing  {CHP DME ADLs:925947773}  Dressing  {CHP DME ADLs:554869228}  Toileting  {CHP DME ADLs:639842810}  Feeding  {CHP DME ADLs:895468701}  Med Admin  {P DME ADLs:154514084}  Med Delivery   {[facility] MED  SECTION    Prognosis: Fair    Condition at Discharge: Stable    Rehab Potential (if transferring to Rehab): Fair    Recommended Labs or Other Treatments After Discharge:     Physician Certification: I certify the above information and transfer of Paradise Fairchild  is necessary for the continuing treatment of the diagnosis listed and that she requires Skilled Nursing Facility for less 30 days.     Update Admission H&P: No change in H&P    PHYSICIAN SIGNATURE:  Electronically signed by ENEDELIA AJ CNP on 9/30/24 at 2:27 PM EDT

## 2024-09-30 NOTE — PLAN OF CARE
Problem: Discharge Planning  Goal: Discharge to home or other facility with appropriate resources  9/30/2024 0517 by Marleni Munguia RN  Outcome: Progressing  9/29/2024 1734 by Christa Winter RN  Outcome: Progressing  Flowsheets (Taken 9/29/2024 0800)  Discharge to home or other facility with appropriate resources:   Identify barriers to discharge with patient and caregiver   Identify discharge learning needs (meds, wound care, etc)   Arrange for needed discharge resources and transportation as appropriate   Refer to discharge planning if patient needs post-hospital services based on physician order or complex needs related to functional status, cognitive ability or social support system     Problem: Pain  Goal: Verbalizes/displays adequate comfort level or baseline comfort level  9/30/2024 0517 by Marleni Munguia RN  Outcome: Progressing  9/29/2024 1734 by Christa Winter RN  Outcome: Progressing     Problem: Skin/Tissue Integrity  Goal: Absence of new skin breakdown  Description: 1.  Monitor for areas of redness and/or skin breakdown  2.  Assess vascular access sites hourly  3.  Every 4-6 hours minimum:  Change oxygen saturation probe site  4.  Every 4-6 hours:  If on nasal continuous positive airway pressure, respiratory therapy assess nares and determine need for appliance change or resting period.  9/30/2024 0517 by Marleni Munguia RN  Outcome: Progressing  9/29/2024 1734 by Christa Winter RN  Outcome: Progressing     Problem: Safety - Adult  Goal: Free from fall injury  9/30/2024 0517 by Marleni Munguia, RN  Outcome: Progressing  9/29/2024 1734 by Christa Winter, RN  Outcome: Progressing  Flowsheets (Taken 9/29/2024 0722)  Free From Fall Injury: Instruct family/caregiver on patient safety

## 2024-10-01 ENCOUNTER — APPOINTMENT (OUTPATIENT)
Age: 74
End: 2024-10-01
Attending: INTERNAL MEDICINE
Payer: MEDICARE

## 2024-10-01 ENCOUNTER — APPOINTMENT (OUTPATIENT)
Dept: GENERAL RADIOLOGY | Age: 74
End: 2024-10-01
Payer: MEDICARE

## 2024-10-01 LAB
ANION GAP SERPL CALCULATED.3IONS-SCNC: 6 MMOL/L (ref 9–16)
ANION GAP SERPL CALCULATED.3IONS-SCNC: 7 MMOL/L (ref 9–16)
BACTERIA URNS QL MICRO: NORMAL
BASOPHILS # BLD: 0.04 K/UL (ref 0–0.2)
BASOPHILS NFR BLD: 1 % (ref 0–2)
BILIRUB UR QL STRIP: NEGATIVE
BNP SERPL-MCNC: 442 PG/ML (ref 0–300)
BUN SERPL-MCNC: 14 MG/DL (ref 8–23)
BUN SERPL-MCNC: 16 MG/DL (ref 8–23)
CALCIUM SERPL-MCNC: 8.5 MG/DL (ref 8.6–10.4)
CALCIUM SERPL-MCNC: 8.7 MG/DL (ref 8.6–10.4)
CASTS #/AREA URNS LPF: NORMAL /LPF (ref 0–8)
CHLORIDE SERPL-SCNC: 104 MMOL/L (ref 98–107)
CHLORIDE SERPL-SCNC: 104 MMOL/L (ref 98–107)
CLARITY UR: CLEAR
CO2 SERPL-SCNC: 27 MMOL/L (ref 20–31)
CO2 SERPL-SCNC: 30 MMOL/L (ref 20–31)
COLOR UR: YELLOW
CREAT SERPL-MCNC: 1.3 MG/DL (ref 0.5–0.9)
CREAT SERPL-MCNC: 1.3 MG/DL (ref 0.5–0.9)
CREAT UR-MCNC: 81.4 MG/DL (ref 28–217)
ECHO AO ROOT DIAM: 2.9 CM
ECHO AO ROOT INDEX: 1.76 CM/M2
ECHO AV AREA PEAK VELOCITY: 3 CM2
ECHO AV AREA VTI: 3.2 CM2
ECHO AV AREA/BSA PEAK VELOCITY: 1.8 CM2/M2
ECHO AV AREA/BSA VTI: 1.9 CM2/M2
ECHO AV MEAN GRADIENT: 3 MMHG
ECHO AV MEAN VELOCITY: 0.8 M/S
ECHO AV PEAK GRADIENT: 6 MMHG
ECHO AV PEAK VELOCITY: 1.2 M/S
ECHO AV VELOCITY RATIO: 0.92
ECHO AV VTI: 28.2 CM
ECHO BSA: 1.68 M2
ECHO EST RA PRESSURE: 3 MMHG
ECHO LA AREA 2C: 17.5 CM2
ECHO LA AREA 4C: 17.3 CM2
ECHO LA DIAMETER INDEX: 2.24 CM/M2
ECHO LA DIAMETER: 3.7 CM
ECHO LA MAJOR AXIS: 5.5 CM
ECHO LA MINOR AXIS: 5 CM
ECHO LA TO AORTIC ROOT RATIO: 1.28
ECHO LA VOL BP: 48 ML (ref 22–52)
ECHO LA VOL MOD A2C: 49 ML (ref 22–52)
ECHO LA VOL MOD A4C: 44 ML (ref 22–52)
ECHO LA VOL/BSA BIPLANE: 29 ML/M2 (ref 16–34)
ECHO LA VOLUME INDEX MOD A2C: 30 ML/M2 (ref 16–34)
ECHO LA VOLUME INDEX MOD A4C: 27 ML/M2 (ref 16–34)
ECHO LV E' LATERAL VELOCITY: 10.6 CM/S
ECHO LV E' SEPTAL VELOCITY: 6.3 CM/S
ECHO LV EDV A2C: 48 ML
ECHO LV EDV A4C: 62 ML
ECHO LV EDV INDEX A4C: 38 ML/M2
ECHO LV EDV NDEX A2C: 29 ML/M2
ECHO LV EJECTION FRACTION A2C: 66 %
ECHO LV EJECTION FRACTION A4C: 74 %
ECHO LV EJECTION FRACTION BIPLANE: 70 % (ref 55–100)
ECHO LV ESV A2C: 16 ML
ECHO LV ESV A4C: 16 ML
ECHO LV ESV INDEX A2C: 10 ML/M2
ECHO LV ESV INDEX A4C: 10 ML/M2
ECHO LV FRACTIONAL SHORTENING: 36 % (ref 28–44)
ECHO LV INTERNAL DIMENSION DIASTOLE INDEX: 2.36 CM/M2
ECHO LV INTERNAL DIMENSION DIASTOLIC: 3.9 CM (ref 3.9–5.3)
ECHO LV INTERNAL DIMENSION SYSTOLIC INDEX: 1.52 CM/M2
ECHO LV INTERNAL DIMENSION SYSTOLIC: 2.5 CM
ECHO LV IVSD: 1.1 CM (ref 0.6–0.9)
ECHO LV MASS 2D: 131 G (ref 67–162)
ECHO LV MASS INDEX 2D: 79.4 G/M2 (ref 43–95)
ECHO LV POSTERIOR WALL DIASTOLIC: 1 CM (ref 0.6–0.9)
ECHO LV RELATIVE WALL THICKNESS RATIO: 0.51
ECHO LVOT AREA: 3.5 CM2
ECHO LVOT AV VTI INDEX: 0.93
ECHO LVOT DIAM: 2.1 CM
ECHO LVOT MEAN GRADIENT: 2 MMHG
ECHO LVOT PEAK GRADIENT: 4 MMHG
ECHO LVOT PEAK VELOCITY: 1.1 M/S
ECHO LVOT STROKE VOLUME INDEX: 55.2 ML/M2
ECHO LVOT SV: 91 ML
ECHO LVOT VTI: 26.3 CM
ECHO MV A VELOCITY: 1.16 M/S
ECHO MV AREA VTI: 2.2 CM2
ECHO MV E DECELERATION TIME (DT): 289 MS
ECHO MV E VELOCITY: 1.06 M/S
ECHO MV E/A RATIO: 0.91
ECHO MV E/E' LATERAL: 10
ECHO MV E/E' RATIO (AVERAGED): 13.41
ECHO MV E/E' SEPTAL: 16.83
ECHO MV LVOT VTI INDEX: 1.6
ECHO MV MAX VELOCITY: 1.3 M/S
ECHO MV MEAN GRADIENT: 2 MMHG
ECHO MV MEAN VELOCITY: 0.7 M/S
ECHO MV PEAK GRADIENT: 7 MMHG
ECHO MV VTI: 42 CM
ECHO PV MAX VELOCITY: 1 M/S
ECHO PV PEAK GRADIENT: 4 MMHG
ECHO RA AREA 4C: 10.3 CM2
ECHO RA END SYSTOLIC VOLUME APICAL 4 CHAMBER INDEX BSA: 12 ML/M2
ECHO RA VOLUME: 20 ML
ECHO RIGHT VENTRICULAR SYSTOLIC PRESSURE (RVSP): 26 MMHG
ECHO RV BASAL DIMENSION: 3.2 CM
ECHO RV FREE WALL PEAK S': 13.4 CM/S
ECHO RV TAPSE: 2.3 CM (ref 1.7–?)
ECHO TV REGURGITANT MAX VELOCITY: 2.39 M/S
ECHO TV REGURGITANT PEAK GRADIENT: 23 MMHG
EOSINOPHIL # BLD: 0.18 K/UL (ref 0–0.44)
EOSINOPHILS RELATIVE PERCENT: 5 % (ref 1–4)
EPI CELLS #/AREA URNS HPF: NORMAL /HPF (ref 0–5)
ERYTHROCYTE [DISTWIDTH] IN BLOOD BY AUTOMATED COUNT: 14.6 % (ref 11.8–14.4)
GFR, ESTIMATED: 46 ML/MIN/1.73M2
GFR, ESTIMATED: 46 ML/MIN/1.73M2
GLUCOSE BLD-MCNC: 117 MG/DL (ref 65–105)
GLUCOSE BLD-MCNC: 155 MG/DL (ref 65–105)
GLUCOSE BLD-MCNC: 170 MG/DL (ref 65–105)
GLUCOSE BLD-MCNC: 249 MG/DL (ref 65–105)
GLUCOSE SERPL-MCNC: 144 MG/DL (ref 74–99)
GLUCOSE SERPL-MCNC: 98 MG/DL (ref 74–99)
GLUCOSE UR STRIP-MCNC: NEGATIVE MG/DL
HCT VFR BLD AUTO: 33.4 % (ref 36.3–47.1)
HGB BLD-MCNC: 11 G/DL (ref 11.9–15.1)
HGB UR QL STRIP.AUTO: NEGATIVE
IMM GRANULOCYTES # BLD AUTO: <0.03 K/UL (ref 0–0.3)
IMM GRANULOCYTES NFR BLD: 1 %
KETONES UR STRIP-MCNC: NEGATIVE MG/DL
LEUKOCYTE ESTERASE UR QL STRIP: ABNORMAL
LYMPHOCYTES NFR BLD: 0.95 K/UL (ref 1.1–3.7)
LYMPHOCYTES RELATIVE PERCENT: 25 % (ref 24–43)
MCH RBC QN AUTO: 31.1 PG (ref 25.2–33.5)
MCHC RBC AUTO-ENTMCNC: 32.9 G/DL (ref 28.4–34.8)
MCV RBC AUTO: 94.4 FL (ref 82.6–102.9)
MONOCYTES NFR BLD: 0.38 K/UL (ref 0.1–1.2)
MONOCYTES NFR BLD: 10 % (ref 3–12)
NEUTROPHILS NFR BLD: 59 % (ref 36–65)
NEUTS SEG NFR BLD: 2.26 K/UL (ref 1.5–8.1)
NITRITE UR QL STRIP: NEGATIVE
NRBC BLD-RTO: 0 PER 100 WBC
PH UR STRIP: 7.5 [PH] (ref 5–8)
PLATELET # BLD AUTO: ABNORMAL K/UL (ref 138–453)
PLATELET, FLUORESCENCE: 58 K/UL (ref 138–453)
PLATELETS.RETICULATED NFR BLD AUTO: 3.8 % (ref 1.1–10.3)
POTASSIUM SERPL-SCNC: 4.8 MMOL/L (ref 3.7–5.3)
POTASSIUM SERPL-SCNC: 5.1 MMOL/L (ref 3.7–5.3)
PROT UR STRIP-MCNC: NEGATIVE MG/DL
RBC # BLD AUTO: 3.54 M/UL (ref 3.95–5.11)
RBC # BLD: ABNORMAL 10*6/UL
RBC #/AREA URNS HPF: NORMAL /HPF (ref 0–4)
SODIUM SERPL-SCNC: 138 MMOL/L (ref 136–145)
SODIUM SERPL-SCNC: 140 MMOL/L (ref 136–145)
SODIUM UR-SCNC: 39 MMOL/L
SP GR UR STRIP: 1.01 (ref 1–1.03)
UROBILINOGEN UR STRIP-ACNC: NORMAL EU/DL (ref 0–1)
WBC #/AREA URNS HPF: NORMAL /HPF (ref 0–5)
WBC OTHER # BLD: 3.8 K/UL (ref 3.5–11.3)

## 2024-10-01 PROCEDURE — 97530 THERAPEUTIC ACTIVITIES: CPT

## 2024-10-01 PROCEDURE — 81001 URINALYSIS AUTO W/SCOPE: CPT

## 2024-10-01 PROCEDURE — 85025 COMPLETE CBC W/AUTO DIFF WBC: CPT

## 2024-10-01 PROCEDURE — 85055 RETICULATED PLATELET ASSAY: CPT

## 2024-10-01 PROCEDURE — 99231 SBSQ HOSP IP/OBS SF/LOW 25: CPT | Performed by: INTERNAL MEDICINE

## 2024-10-01 PROCEDURE — 2060000000 HC ICU INTERMEDIATE R&B

## 2024-10-01 PROCEDURE — 83880 ASSAY OF NATRIURETIC PEPTIDE: CPT

## 2024-10-01 PROCEDURE — 93306 TTE W/DOPPLER COMPLETE: CPT | Performed by: INTERNAL MEDICINE

## 2024-10-01 PROCEDURE — 80048 BASIC METABOLIC PNL TOTAL CA: CPT

## 2024-10-01 PROCEDURE — 71045 X-RAY EXAM CHEST 1 VIEW: CPT

## 2024-10-01 PROCEDURE — 82570 ASSAY OF URINE CREATININE: CPT

## 2024-10-01 PROCEDURE — 2580000003 HC RX 258

## 2024-10-01 PROCEDURE — 6370000000 HC RX 637 (ALT 250 FOR IP)

## 2024-10-01 PROCEDURE — 97116 GAIT TRAINING THERAPY: CPT

## 2024-10-01 PROCEDURE — 97110 THERAPEUTIC EXERCISES: CPT

## 2024-10-01 PROCEDURE — 82947 ASSAY GLUCOSE BLOOD QUANT: CPT

## 2024-10-01 PROCEDURE — 84300 ASSAY OF URINE SODIUM: CPT

## 2024-10-01 PROCEDURE — 36415 COLL VENOUS BLD VENIPUNCTURE: CPT

## 2024-10-01 PROCEDURE — 93306 TTE W/DOPPLER COMPLETE: CPT

## 2024-10-01 PROCEDURE — 99232 SBSQ HOSP IP/OBS MODERATE 35: CPT | Performed by: SURGERY

## 2024-10-01 RX ORDER — SODIUM CHLORIDE 9 MG/ML
INJECTION, SOLUTION INTRAVENOUS CONTINUOUS
Status: DISCONTINUED | OUTPATIENT
Start: 2024-10-01 | End: 2024-10-02

## 2024-10-01 RX ADMIN — POLYETHYLENE GLYCOL 3350 17 G: 17 POWDER, FOR SOLUTION ORAL at 09:42

## 2024-10-01 RX ADMIN — PANTOPRAZOLE SODIUM 40 MG: 40 TABLET, DELAYED RELEASE ORAL at 17:47

## 2024-10-01 RX ADMIN — POLYETHYLENE GLYCOL 3350 17 G: 17 POWDER, FOR SOLUTION ORAL at 21:24

## 2024-10-01 RX ADMIN — INSULIN LISPRO 4 UNITS: 100 INJECTION, SOLUTION INTRAVENOUS; SUBCUTANEOUS at 13:58

## 2024-10-01 RX ADMIN — BUTALBITAL, ACETAMINOPHEN, AND CAFFEINE 1 TABLET: 325; 50; 40 TABLET ORAL at 17:47

## 2024-10-01 RX ADMIN — OXYCODONE 5 MG: 5 TABLET ORAL at 02:02

## 2024-10-01 RX ADMIN — BUTALBITAL, ACETAMINOPHEN, AND CAFFEINE 1 TABLET: 325; 50; 40 TABLET ORAL at 21:22

## 2024-10-01 RX ADMIN — LEVOTHYROXINE SODIUM 112 MCG: 112 TABLET ORAL at 09:39

## 2024-10-01 RX ADMIN — PROPRANOLOL HYDROCHLORIDE 10 MG: 10 TABLET ORAL at 09:40

## 2024-10-01 RX ADMIN — BUPROPION HYDROCHLORIDE 300 MG: 300 TABLET, EXTENDED RELEASE ORAL at 09:39

## 2024-10-01 RX ADMIN — PANTOPRAZOLE SODIUM 40 MG: 40 TABLET, DELAYED RELEASE ORAL at 05:37

## 2024-10-01 RX ADMIN — PROPRANOLOL HYDROCHLORIDE 10 MG: 10 TABLET ORAL at 21:22

## 2024-10-01 RX ADMIN — GABAPENTIN 200 MG: 100 CAPSULE ORAL at 09:39

## 2024-10-01 RX ADMIN — DULOXETINE HYDROCHLORIDE 30 MG: 30 CAPSULE, DELAYED RELEASE ORAL at 09:40

## 2024-10-01 RX ADMIN — CYANOCOBALAMIN TAB 500 MCG 1000 MCG: 500 TAB at 09:40

## 2024-10-01 RX ADMIN — BUTALBITAL, ACETAMINOPHEN, AND CAFFEINE 1 TABLET: 325; 50; 40 TABLET ORAL at 05:37

## 2024-10-01 RX ADMIN — SODIUM CHLORIDE: 9 INJECTION, SOLUTION INTRAVENOUS at 10:22

## 2024-10-01 RX ADMIN — SODIUM CHLORIDE: 9 INJECTION, SOLUTION INTRAVENOUS at 21:00

## 2024-10-01 ASSESSMENT — PAIN SCALES - GENERAL
PAINLEVEL_OUTOF10: 0
PAINLEVEL_OUTOF10: 5
PAINLEVEL_OUTOF10: 1
PAINLEVEL_OUTOF10: 2
PAINLEVEL_OUTOF10: 5
PAINLEVEL_OUTOF10: 1
PAINLEVEL_OUTOF10: 0
PAINLEVEL_OUTOF10: 7

## 2024-10-01 ASSESSMENT — PAIN SCALES - WONG BAKER
WONGBAKER_NUMERICALRESPONSE: NO HURT

## 2024-10-01 ASSESSMENT — PAIN DESCRIPTION - FREQUENCY
FREQUENCY: CONTINUOUS
FREQUENCY: CONTINUOUS

## 2024-10-01 ASSESSMENT — PAIN DESCRIPTION - ORIENTATION
ORIENTATION: MID
ORIENTATION: MID;POSTERIOR
ORIENTATION: MID;POSTERIOR

## 2024-10-01 ASSESSMENT — PAIN DESCRIPTION - LOCATION
LOCATION: HEAD
LOCATION: BACK;NECK;HEAD

## 2024-10-01 ASSESSMENT — PAIN DESCRIPTION - PAIN TYPE
TYPE: ACUTE PAIN
TYPE: ACUTE PAIN

## 2024-10-01 ASSESSMENT — PAIN DESCRIPTION - ONSET: ONSET: ON-GOING

## 2024-10-01 ASSESSMENT — PAIN DESCRIPTION - DESCRIPTORS
DESCRIPTORS: JABBING;DISCOMFORT
DESCRIPTORS: ACHING

## 2024-10-01 NOTE — PROGRESS NOTES
Spiritual Health History and Assessment/Progress Note  [facility]    (P) Spiritual/Emotional Needs,  ,  ,      Name: Paradise Fairchild MRN: [id number]    Age: 74 y.o.     Sex: female   Language: English   Christian: Anglican   Subarachnoid bleed (HCC)     Date: 9/30/2024            Total Time Calculated: (P) 20 min              Spiritual Assessment began in [facility]        Referral/Consult From: (P) Rounding   Encounter Overview/Reason: (P) Spiritual/Emotional Needs  Service Provided For: (P) Patient    Emma, Belief, Meaning:   Patient is connected with a emma tradition or spiritual practice  Family/Friends No family/friends present      Importance and Influence:  Patient has no beliefs influential to healthcare decision-making identified during this visit  Family/Friends No family/friends present    Community:  Patient is connected with a spiritual community and feels well-supported. Support system includes: Spouse/Partner, Children, and Extended family  Family/Friends No family/friends present    Assessment and Plan of Care:     Patient Interventions include: Other:  provided a supportive presence through active listening, prayer, and words of affirmation.  Family/Friends Interventions include: No family/friends present    Patient Plan of Care: Spiritual Care available upon further referral  Family/Friends Plan of Care: Spiritual Care available upon further referral    Electronically signed by Chaplain Loretta on 9/30/2024 at 11:17 PM

## 2024-10-01 NOTE — PROGRESS NOTES
PROGRESS NOTE    PATIENT NAME: Paradise Fairchild  MEDICAL RECORD NO. [id number]  DATE: 10/1/2024    HD: # 3    DIAGNOSIS AND PLAN    Subarachnoid bleed, BIG 2   Continue to monitor neuro status  Neurology consulted due to concern for syncope/seizure-recommend reconsidering administration of prazosin  MRI with [facility] - continue neuro checks- hold hep dvt proph  Orthostatic hypotension  Holding prazosin  Will need PT/OT when ambulation is safe  Echo pending  Elevated cr  Strict record urin output  Urine studies pending  Start mivf  Oxygen requirement  IS/pulm hygiene  Bnp added on to am labs  Home cpap ordered  Cxr this am  Geriatrics following due to positive frailty  DVT Prophylaxis-held in the setting of intracranial bleed    Chief Complaint: \"I am doing okay today\"    SUBJECTIVE    Patient seen and examined at the bedside.  No acute events reported overnight.  Vital signs stable this a.m.  Patient is on 2 L nasal cannula.  Denies chest pain or shortness of breath.  States she is eating and drinking appropriately ambulating to the bathroom on her own accord without dizziness.  Urinated 5 times overnight.    OBJECTIVE  VITALS:   Vitals:    10/01/24 0537   BP:    Pulse: 68   Resp: 17   Temp:    SpO2: 95%     Physical Exam  Constitutional:       Appearance: Normal appearance.   HENT:      Head: Normocephalic and atraumatic.      Right Ear: External ear normal.      Left Ear: External ear normal.      Nose: Nose normal.      Mouth/Throat:      Mouth: Mucous membranes are moist.   Eyes:      Extraocular Movements: Extraocular movements intact.   Cardiovascular:      Rate and Rhythm: Normal rate and regular rhythm.      Pulses: Normal pulses.   Pulmonary:      Effort: Pulmonary effort is normal.   Abdominal:      General: Abdomen is flat.      Palpations: Abdomen is soft.   Musculoskeletal:         General: Normal range of motion.      Cervical back: Normal range of motion.   Skin:     General: Skin is warm and dry.       spine. 3. Cirrhosis with moderate splenomegaly and small volume of ascites.     CT LUMBAR SPINE BONY RECONSTRUCTION    Result Date: 9/28/2024  1. No acute traumatic injury of the chest, abdomen, or pelvis. 2. No acute osseous abnormality of the thoracic or lumbar spine. 3. Cirrhosis with moderate splenomegaly and small volume of ascites.     Whitley Stapleton DO    Attestation signed by Jb Finch MD    I personally evaluated the patient and directed the medical decision making with Resident/VIDHI after the physical/radiologic exam and laboratory values were reviewed and confirmed.      Jb Finch MD       10/1/2024, 7:59 AM

## 2024-10-01 NOTE — PROGRESS NOTES
[facility] Inpatient Progress Note  PADMINI Beard   10/1/2024  1:53 PM  Paradise AGUILAR Fairchild  1950  [id number]      Time Spent with Patient: 15 minutes or less (Brief Diagnostic Assessment) 48752    Pt was provided informed consent for the [facility]. Discussed with patient model of service to include the limits of confidentiality (i.e. abuse reporting, suicide intervention, etc.) and short-term intervention focused approach.  Pt indicated understanding.      Therapist stopped by pt room.  Pt was pleasant and smiling and denied needs at this time.  Family member present.  Pt reports she is coping with potential dc plan.  Therapist to stop by tomorrow to conduct screens.

## 2024-10-01 NOTE — PROGRESS NOTES
[facility]  Office: [phone]  Barry Moe, DO, Everett Cohen, DO, Abdulaziz Colindres DO, Prashant Benitez, DO, Steffanie Escamilla MD, Margarita Keene MD, Eliza Yang MD, Tatyana Swain MD,  Kin Simental MD, Willie Gonzalez MD, Marii Pedroza MD,  Jerman Soriano DO, Porfirio Wilder MD, Steven Rodriges MD, Duane Moe DO, Funmi Reyez MD,  Gerard Franco DO, Kelli Garibay MD, Chrissie Martinez MD, Lou Zhu MD, Margarita Corbin MD,  Rick Cooley MD, Sabi Conti MD, Geeta Lopes MD, Richelle Marie MD, Hang Helm MD, Nurys Monson MD, Yoel Rand, DO, Jaylen Harrington DO, Saúl Olmos DO, Ayan Badillo MD, Shirley Waterhouse, CNP,  Jennyfer Mcallister CNP, Yoel Mitchell, CNP,  Niya Roldan, DNP, Camille Cabral, CNP, Lavinia Brown, CNP, Geni Wilkerson, CNP, Saira Reynolds, CNP, Ailyn Mcintyre, PA-C, Beryl Mcmahan PA-C, Mary Anne Kaur, CNP, Sandy Sanchez, CNP,  Madhuri Arreola, CNP, Jeny Goldman, CNP, Nelly Wu, CNP, Serena Collazo, CNS, Antionette Almaguer, CNP, Negra Barber, CNP, Tracy Schwab, CNP         [facility]   IN-PATIENT SERVICE   [facility]    Progress Note    10/1/2024    11:28 AM    Name:   Paradise Fairchild  MRN:     [id number]     Acct:      [id number]   Room:   0139/0139-01   Day:  3  Admit Date:  9/28/2024 12:36 AM    PCP:   Albaro Rodriges DO  Code Status:  Full Code    Subjective:     C/C:   Chief Complaint   Patient presents with    Fall     [place]     Interval History Status: not changed.     No issues overnight  Asking about further plan  Discussed with  at bedside  Discussed with RN  No other complaints     Medications:     Allergies:    Allergies   Allergen Reactions    Nsaids        Current Meds:   Scheduled Meds:    polyethylene glycol  17 g Oral BID    [Held by provider] heparin (porcine)  5,000 Units SubCUTAneous 3 times per day    insulin lispro  0-16 Units SubCUTAneous TID WC    insulin lispro  0-4  4.8    105 104   CO2 26 22 27   GLUCOSE 163* 140* 144*   BUN 12 14 16   CREATININE 1.0* 1.1* 1.3*   ANIONGAP 8* 9 7*   LABGLOM 57* 52* 46*   CALCIUM 8.2* 8.1* 8.5*   PROBNP  --   --  442*     Recent Labs     09/29/24 1952 09/30/24  0952 09/30/24  1134 09/30/24  1531 09/30/24  1948 10/01/24  0920   POCGLU 240* 230* 265* 227* 98 117*     ABG:  Lab Results   Component Value Date/Time    FIO2 INFORMATION NOT PROVIDED 09/28/2024 12:51 AM     No results found for: \"SPECIAL\"  No results found for: \"CULTURE\"    Radiology:  XR CHEST PORTABLE    Result Date: 10/1/2024  Subtle patchy right lower lobe airspace opacity which may represent an early developing pneumonia in the correct clinical setting.  Correlate with clinical findings.     MRI BRAIN W WO CONTRAST    Result Date: 9/30/2024  1. Trace amount of subarachnoid blood along the paramedian right parietal lobe.  No significant mass effect or midline shift.  This corresponds to the area seen on the noncontrast CT. 2. Nonspecific trace bilateral pachymeningeal enhancement. 3. Otherwise, no acute intracranial abnormality.  No acute infarct. 4. Mild global parenchymal volume loss with chronic microvascular ischemic changes. 5. Nonspecific 7 mm enhancing nodule along the right frontal scalp.     CTA HEAD NECK W CONTRAST    Result Date: 9/29/2024  No hemodynamically significant stenosis, occlusion, or aneurysm or AVM of the major arteries of the head and neck.     CT HEAD WO CONTRAST    Addendum Date: 9/29/2024    ADDENDUM: Results were communicated to Dr. Triston Gustafson at 1131 AM on 9/29/24.     Result Date: 9/29/2024  Thin linear hyperdense focus immediately adjacent to the posterior aspect of the falx, which appears to extend into the right sub parietal sulcus. Findings may represent a small subarachnoid hemorrhage.  Recommend attention on follow-up.     CT CHEST ABDOMEN PELVIS W CONTRAST Additional Contrast? None    Result Date: 9/28/2024  1. No acute traumatic injury of

## 2024-10-01 NOTE — PROGRESS NOTES
Physical Therapy  Facility/Department: [address] STEPDOWN  Physical Therapy Treatment Note    Name: Paradise Fairchild  : 1950  MRN: [id number]  Date of Service: 10/1/2024    Discharge Recommendations:  Further therapy recommended at discharge.The patient should be able to tolerate at least 3 hours of therapy per day over 5 days or 15 hours over 7 days.   This patient may benefit from a Physical Medicine and Rehab consult.     PT Equipment Recommendations  Equipment Needed: Yes  Mobility Devices: Walker  Walker: Rolling      Patient Diagnosis(es): The primary encounter diagnosis was Fall, initial encounter. A diagnosis of Syncope, unspecified syncope type was also pertinent to this visit.  Past Medical History:  has no past medical history on file.  Past Surgical History:  has no past surgical history on file.    Assessment  Body Structures, Functions, Activity Limitations Requiring Skilled Therapeutic Intervention: Decreased functional mobility ;Decreased balance  Assessment: Pt completed bed mobility with SBA and functional transfers with CGA. Pt ambulated without AD requiring CGA initially but with increased distance and fatigue, pt demonstrated increasing unsteadiness with 2 instances of LOB requiring Max A to prevent falling. Pt completed 10 steps with Nacho for balance, again with decreased foot clearance making her high risk for falls. Pt reports independent prior level of function, would benefit from intensive PT to regain functional independence. Based on today's session, pt is high risk for falls and would require assistance with mobility and use of RW at this time.  Therapy Prognosis: Good  Requires PT Follow-Up: Yes  Activity Tolerance  Activity Tolerance: Patient limited by endurance;Patient limited by fatigue    Plan  Physical Therapy Plan  General Plan:  (5-6x/wk)  Current Treatment Recommendations: Strengthening, Gait training, Stair training, Functional mobility training, Transfer training,  Patient/Caregiver education & training, Safety education & training, Home exercise program, Therapeutic activities  Safety Devices  Type of Devices: Gait belt, Call light within reach, Nurse notified, All fall risk precautions in place, Left in chair, Patient at risk for falls  Restraints  Restraints Initially in Place: No    Restrictions  Restrictions/Precautions  Restrictions/Precautions: Fall Risk  Required Braces or Orthoses?: Yes (Has a AFO-unsure of which foot has drop as pt does not need it for room distances)  Position Activity Restriction  Other position/activity restrictions: up w/ assist     Subjective  Pain: no c/o pain  General  Chart Reviewed: No  Patient assessed for rehabilitation services?: Yes  Response To Previous Treatment: Patient with no complaints from previous session.  Family / Caregiver Present: No  Follows Commands: Within Functional Limits  General Comment  Comments: Pt left seated in recliner with call light within reach, set up with breakfast tray at this writer's exit  Subjective  Subjective: Pt and RN agreeable to PT. Pt resting in bed upon arrival, states she has \"just returned from having ECHO\". Very pleasant and cooperative with treatment, no c/o pain at this time.     Cognition   Orientation  Overall Orientation Status: Within Functional Limits  Orientation Level: Oriented X4  Cognition  Overall Cognitive Status: WFL    Objective  SpO2: 95 %  O2 Device: None (Room air)     Observation/Palpation  Posture: Good  Observation: pt has R foot AFO, able to don with Nacho to maintain seated balance while seated in recliner     Bed mobility  Supine to Sit: Stand by assistance  Sit to Supine:  (pt left seated in recliner)  Scooting: Stand by assistance  Bed Mobility Comments: HOB elevated to ~30 degrees, pt utilized bedrails as needed  Transfers  Sit to Stand: Contact guard assistance  Stand to Sit: Contact guard assistance  Comment: pt stood from EOB with no AD, able to complete without

## 2024-10-01 NOTE — PLAN OF CARE
Problem: Discharge Planning  Goal: Discharge to home or other facility with appropriate resources  Outcome: Progressing  Flowsheets (Taken 10/1/2024 0800)  Discharge to home or other facility with appropriate resources: Identify barriers to discharge with patient and caregiver     Problem: Pain  Goal: Verbalizes/displays adequate comfort level or baseline comfort level  Outcome: Progressing  Flowsheets (Taken 10/1/2024 1755)  Verbalizes/displays adequate comfort level or baseline comfort level: Encourage patient to monitor pain and request assistance     Problem: Skin/Tissue Integrity  Goal: Absence of new skin breakdown  Description: 1.  Monitor for areas of redness and/or skin breakdown  2.  Assess vascular access sites hourly  3.  Every 4-6 hours minimum:  Change oxygen saturation probe site  4.  Every 4-6 hours:  If on nasal continuous positive airway pressure, respiratory therapy assess nares and determine need for appliance change or resting period.  Outcome: Progressing     Problem: Safety - Adult  Goal: Free from fall injury  Outcome: Progressing  Flowsheets (Taken 10/1/2024 1515)  Free From Fall Injury: Instruct family/caregiver on patient safety     Problem: Chronic Conditions and Co-morbidities  Goal: Patient's chronic conditions and co-morbidity symptoms are monitored and maintained or improved  Outcome: Progressing

## 2024-10-01 NOTE — PROGRESS NOTES
[facility] Inpatient Progress Note  PADMINI Beard   10/1/2024  9:05 AM  Paradise Fairchild  1950  [id number]      Therapist dropped off list of referrals for pt and explained list of online vs in person providers.  Pt about to eat breakfast.  Therapist to stop by later today or tomorrow for check in.

## 2024-10-02 VITALS
BODY MASS INDEX: 26.13 KG/M2 | TEMPERATURE: 97.8 F | SYSTOLIC BLOOD PRESSURE: 148 MMHG | RESPIRATION RATE: 16 BRPM | DIASTOLIC BLOOD PRESSURE: 76 MMHG | WEIGHT: 142 LBS | HEIGHT: 62 IN | OXYGEN SATURATION: 93 % | HEART RATE: 66 BPM

## 2024-10-02 LAB
ANION GAP SERPL CALCULATED.3IONS-SCNC: 8 MMOL/L (ref 9–16)
BASOPHILS # BLD: 0.04 K/UL (ref 0–0.2)
BASOPHILS NFR BLD: 1 % (ref 0–2)
BUN SERPL-MCNC: 13 MG/DL (ref 8–23)
CALCIUM SERPL-MCNC: 8.6 MG/DL (ref 8.6–10.4)
CHLORIDE SERPL-SCNC: 108 MMOL/L (ref 98–107)
CO2 SERPL-SCNC: 26 MMOL/L (ref 20–31)
CREAT SERPL-MCNC: 1.1 MG/DL (ref 0.5–0.9)
EOSINOPHIL # BLD: 0.21 K/UL (ref 0–0.44)
EOSINOPHILS RELATIVE PERCENT: 6 % (ref 1–4)
ERYTHROCYTE [DISTWIDTH] IN BLOOD BY AUTOMATED COUNT: 14.4 % (ref 11.8–14.4)
GFR, ESTIMATED: 56 ML/MIN/1.73M2
GLUCOSE BLD-MCNC: 113 MG/DL (ref 65–105)
GLUCOSE SERPL-MCNC: 140 MG/DL (ref 74–99)
HCT VFR BLD AUTO: 36.3 % (ref 36.3–47.1)
HGB BLD-MCNC: 11.5 G/DL (ref 11.9–15.1)
IMM GRANULOCYTES # BLD AUTO: 0.03 K/UL (ref 0–0.3)
IMM GRANULOCYTES NFR BLD: 1 %
LYMPHOCYTES NFR BLD: 1.03 K/UL (ref 1.1–3.7)
LYMPHOCYTES RELATIVE PERCENT: 27 % (ref 24–43)
MCH RBC QN AUTO: 31.4 PG (ref 25.2–33.5)
MCHC RBC AUTO-ENTMCNC: 31.7 G/DL (ref 28.4–34.8)
MCV RBC AUTO: 99.2 FL (ref 82.6–102.9)
MONOCYTES NFR BLD: 0.43 K/UL (ref 0.1–1.2)
MONOCYTES NFR BLD: 11 % (ref 3–12)
NEUTROPHILS NFR BLD: 54 % (ref 36–65)
NEUTS SEG NFR BLD: 2.06 K/UL (ref 1.5–8.1)
NRBC BLD-RTO: 0 PER 100 WBC
PLATELET # BLD AUTO: 65 K/UL (ref 138–453)
PMV BLD AUTO: 11.3 FL (ref 8.1–13.5)
POTASSIUM SERPL-SCNC: 4.5 MMOL/L (ref 3.7–5.3)
RBC # BLD AUTO: 3.66 M/UL (ref 3.95–5.11)
SODIUM SERPL-SCNC: 142 MMOL/L (ref 136–145)
WBC OTHER # BLD: 3.8 K/UL (ref 3.5–11.3)

## 2024-10-02 PROCEDURE — 6370000000 HC RX 637 (ALT 250 FOR IP)

## 2024-10-02 PROCEDURE — 97535 SELF CARE MNGMENT TRAINING: CPT

## 2024-10-02 PROCEDURE — 99231 SBSQ HOSP IP/OBS SF/LOW 25: CPT | Performed by: INTERNAL MEDICINE

## 2024-10-02 PROCEDURE — 99223 1ST HOSP IP/OBS HIGH 75: CPT | Performed by: PHYSICAL MEDICINE & REHABILITATION

## 2024-10-02 PROCEDURE — 97530 THERAPEUTIC ACTIVITIES: CPT

## 2024-10-02 PROCEDURE — 85025 COMPLETE CBC W/AUTO DIFF WBC: CPT

## 2024-10-02 PROCEDURE — 94660 CPAP INITIATION&MGMT: CPT

## 2024-10-02 PROCEDURE — 36415 COLL VENOUS BLD VENIPUNCTURE: CPT

## 2024-10-02 PROCEDURE — 80048 BASIC METABOLIC PNL TOTAL CA: CPT

## 2024-10-02 PROCEDURE — 82947 ASSAY GLUCOSE BLOOD QUANT: CPT

## 2024-10-02 RX ADMIN — PRAVASTATIN SODIUM 40 MG: 20 TABLET ORAL at 09:02

## 2024-10-02 RX ADMIN — PROPRANOLOL HYDROCHLORIDE 10 MG: 10 TABLET ORAL at 09:02

## 2024-10-02 RX ADMIN — CYANOCOBALAMIN TAB 500 MCG 1000 MCG: 500 TAB at 09:02

## 2024-10-02 RX ADMIN — PANTOPRAZOLE SODIUM 40 MG: 40 TABLET, DELAYED RELEASE ORAL at 09:02

## 2024-10-02 RX ADMIN — DULOXETINE HYDROCHLORIDE 30 MG: 30 CAPSULE, DELAYED RELEASE ORAL at 09:02

## 2024-10-02 RX ADMIN — OXYCODONE 5 MG: 5 TABLET ORAL at 00:30

## 2024-10-02 RX ADMIN — LEVOTHYROXINE SODIUM 112 MCG: 112 TABLET ORAL at 09:02

## 2024-10-02 RX ADMIN — BUPROPION HYDROCHLORIDE 300 MG: 300 TABLET, EXTENDED RELEASE ORAL at 09:02

## 2024-10-02 RX ADMIN — POLYETHYLENE GLYCOL 3350 17 G: 17 POWDER, FOR SOLUTION ORAL at 09:04

## 2024-10-02 ASSESSMENT — PAIN DESCRIPTION - ORIENTATION
ORIENTATION: MID;POSTERIOR

## 2024-10-02 ASSESSMENT — PAIN DESCRIPTION - LOCATION
LOCATION: HEAD

## 2024-10-02 ASSESSMENT — PAIN DESCRIPTION - PAIN TYPE
TYPE: ACUTE PAIN
TYPE: ACUTE PAIN

## 2024-10-02 ASSESSMENT — PAIN SCALES - GENERAL
PAINLEVEL_OUTOF10: 2
PAINLEVEL_OUTOF10: 3
PAINLEVEL_OUTOF10: 7
PAINLEVEL_OUTOF10: 0
PAINLEVEL_OUTOF10: 4

## 2024-10-02 ASSESSMENT — PAIN DESCRIPTION - DESCRIPTORS
DESCRIPTORS: ACHING

## 2024-10-02 ASSESSMENT — PAIN DESCRIPTION - FREQUENCY
FREQUENCY: INTERMITTENT
FREQUENCY: INTERMITTENT

## 2024-10-02 ASSESSMENT — PAIN SCALES - WONG BAKER
WONGBAKER_NUMERICALRESPONSE: NO HURT
WONGBAKER_NUMERICALRESPONSE: NO HURT

## 2024-10-02 NOTE — CONSULTS
Physical Medicine & Rehabilitation  Consult Note      Admitting Physician:   Yanick Prieto*    Primary Care Provider:   Albaro Rodriges DO     Reason for Consult:  Acute Inpatient Rehabilitation    Chief Complaint: Fall    History of Present Illness:  Referring Provider is requesting an evaluation for appropriate placement upon discharge from acute care. History from chart review and patient, spouse.    Paradise Fairchild is a 74 y.o. RHD female admitted to [facility] on 9/28/2024.      Patient admitted from [facility] after fall at home where she hit her head. She denied LOC.     Neurology: evaluated for SAH on CT head. Attributing collapse to syncopal episode.     She is observed dressing with SBA from her  and ambulating within her room without assistance. Her  notes this is the second fall in the past year where she struck her head.     Review of Systems:  Constitutional: negative for anorexia, chills, fatigue, fevers, sweats and weight loss  Eyes: negative for redness and visual disturbance  Ears, nose, mouth, throat, and face: negative for earaches, sore throat and tinnitus  Respiratory: negative for cough and shortness of breath  Cardiovascular: negative for chest pain, dyspnea and palpitations  Gastrointestinal: negative for abdominal pain, change in bowel habits, constipation, nausea and vomiting  Genitourinary:negative for dysuria, frequency, hesitancy and urinary incontinence  Integument/breast: negative for pruritus and rash  Musculoskeletal:negative for stiff joints  Neurological: negative for dizziness, positive for headaches   Behavioral/Psych: negative for decreased appetite, depression        Premorbid function:  Independent    Current function:  Restrictions/ Precautions-  Restrictions/Precautions  Restrictions/Precautions: Fall Risk  Required Braces or Orthoses?: Yes (Has a AFO-unsure of which foot has drop as pt does not need it for room distances)    PT:      Bed  140 142   K 4.8 5.1 4.5    104 108*   CO2 27 30 26   ANIONGAP 7* 6* 8*   LABGLOM 46* 46* 56*     HbA1c:   Lab Results   Component Value Date    LABA1C 5.7 09/28/2024     BNP: No results for input(s): \"BNP\" in the last 72 hours.  PT/INR: No results for input(s): \"PROTIME\", \"INR\" in the last 72 hours.  APTT: No results for input(s): \"APTT\" in the last 72 hours.  CARDIAC ENZYMES: No results for input(s): \"CKMB\", \"CKMBINDEX\", \"TROPONINT\", \"TROPHS\", \"TROPII\" in the last 72 hours.    Invalid input(s): \"CKTOTAL;3\"   FASTING LIPID PANEL:No results found for: \"CHOL\", \"HDL\", \"TRIG\"  LIVER PROFILE: No results for input(s): \"AST\", \"ALT\", \"BILIDIR\", \"BILITOT\", \"ALKPHOS\" in the last 72 hours.    Invalid input(s): \"ALB\"     Radiology:      XR CHEST PORTABLE    Result Date: 10/1/2024  Subtle patchy right lower lobe airspace opacity which may represent an early developing pneumonia in the correct clinical setting.  Correlate with clinical findings.     MRI BRAIN W WO CONTRAST    Result Date: 9/30/2024  1. Trace amount of subarachnoid blood along the paramedian right parietal lobe.  No significant mass effect or midline shift.  This corresponds to the area seen on the noncontrast CT. 2. Nonspecific trace bilateral pachymeningeal enhancement. 3. Otherwise, no acute intracranial abnormality.  No acute infarct. 4. Mild global parenchymal volume loss with chronic microvascular ischemic changes. 5. Nonspecific 7 mm enhancing nodule along the right frontal scalp.     CTA HEAD NECK W CONTRAST    Result Date: 9/29/2024  No hemodynamically significant stenosis, occlusion, or aneurysm or AVM of the major arteries of the head and neck.     CT HEAD WO CONTRAST    Addendum Date: 9/29/2024    ADDENDUM: Results were communicated to Dr. Triston Gustafson at 1131 AM on 9/29/24.     Result Date: 9/29/2024  Thin linear hyperdense focus immediately adjacent to the posterior aspect of the falx, which appears to extend into the right sub parietal sulcus.

## 2024-10-02 NOTE — PLAN OF CARE
Problem: Discharge Planning  Goal: Discharge to home or other facility with appropriate resources  10/2/2024 1246 by Sherly Ceballos RN  Outcome: Adequate for Discharge  10/2/2024 1243 by Sherly Ceballos RN  Flowsheets (Taken 10/2/2024 0800)  Discharge to home or other facility with appropriate resources: Identify barriers to discharge with patient and caregiver  10/2/2024 0419 by Clotilde Garcia RN  Outcome: Progressing     Problem: Pain  Goal: Verbalizes/displays adequate comfort level or baseline comfort level  10/2/2024 1246 by Sherly Ceballos RN  Outcome: Adequate for Discharge  10/2/2024 1243 by Sherly Ceballos RN  Flowsheets (Taken 10/1/2024 1755)  Verbalizes/displays adequate comfort level or baseline comfort level: Encourage patient to monitor pain and request assistance  10/2/2024 0419 by Clotilde Garcia RN  Outcome: Progressing     Problem: Skin/Tissue Integrity  Goal: Absence of new skin breakdown  Description: 1.  Monitor for areas of redness and/or skin breakdown  2.  Assess vascular access sites hourly  3.  Every 4-6 hours minimum:  Change oxygen saturation probe site  4.  Every 4-6 hours:  If on nasal continuous positive airway pressure, respiratory therapy assess nares and determine need for appliance change or resting period.  10/2/2024 1246 by Sherly Ceballos RN  Outcome: Adequate for Discharge  10/2/2024 0419 by Clotilde Garcia RN  Outcome: Progressing     Problem: Safety - Adult  Goal: Free from fall injury  10/2/2024 1246 by Sherly Ceballos RN  Outcome: Adequate for Discharge  10/2/2024 1243 by Sherly Ceballos RN  Flowsheets (Taken 10/2/2024 0906)  Free From Fall Injury: Instruct family/caregiver on patient safety  10/2/2024 0419 by Clotilde Garcia RN  Outcome: Progressing     Problem: Chronic Conditions and Co-morbidities  Goal: Patient's chronic conditions and co-morbidity symptoms are monitored and maintained or improved  10/2/2024 1246 by Lin  JAZMÍN Dubois  Outcome: Adequate for Discharge  10/2/2024 0419 by Clotilde Garcia RN  Outcome: Progressing     Problem: Discharge Planning  Goal: Discharge to home or other facility with appropriate resources  10/2/2024 1246 by Sherly Ceballos RN  Outcome: Adequate for Discharge  10/2/2024 1243 by Sherly Ceballos RN  Flowsheets (Taken 10/2/2024 0800)  Discharge to home or other facility with appropriate resources: Identify barriers to discharge with patient and caregiver  10/2/2024 0419 by Clotilde Garcia RN  Outcome: Progressing     Problem: Pain  Goal: Verbalizes/displays adequate comfort level or baseline comfort level  10/2/2024 1246 by Sherly Ceballos RN  Outcome: Adequate for Discharge  10/2/2024 1243 by Sherly Ceballos RN  Flowsheets (Taken 10/1/2024 1755)  Verbalizes/displays adequate comfort level or baseline comfort level: Encourage patient to monitor pain and request assistance  10/2/2024 0419 by Clotilde Garcia RN  Outcome: Progressing     Problem: Skin/Tissue Integrity  Goal: Absence of new skin breakdown  Description: 1.  Monitor for areas of redness and/or skin breakdown  2.  Assess vascular access sites hourly  3.  Every 4-6 hours minimum:  Change oxygen saturation probe site  4.  Every 4-6 hours:  If on nasal continuous positive airway pressure, respiratory therapy assess nares and determine need for appliance change or resting period.  10/2/2024 1246 by Sherly Ceballos RN  Outcome: Adequate for Discharge  10/2/2024 0419 by Clotilde Garcia RN  Outcome: Progressing     Problem: Safety - Adult  Goal: Free from fall injury  10/2/2024 1246 by Sherly Ceballos RN  Outcome: Adequate for Discharge  10/2/2024 1243 by Sherly Ceballos RN  Flowsheets (Taken 10/2/2024 0906)  Free From Fall Injury: Instruct family/caregiver on patient safety  10/2/2024 0419 by Clotilde Garcia RN  Outcome: Progressing     Problem: Chronic Conditions and Co-morbidities  Goal: Patient's chronic

## 2024-10-02 NOTE — PROGRESS NOTES
Discharged pt home , left unit via wheelchair accompanied with staff. Discharge instructions given to patient and , verbalized understanding via feedback.Left with all belongings and discharge papers in possession. Fair well wishes expressed to pt upon departure.

## 2024-10-02 NOTE — PROGRESS NOTES
PROGRESS NOTE    PATIENT NAME: Paradise Fairchild  MEDICAL RECORD NO. [id number]  DATE: 10/2/2024    HD: # 4    DIAGNOSIS AND PLAN    Subarachnoid bleed, BIG 2   Continue to monitor neuro status  Neurology consulted due to concern for syncope/seizure-recommend reconsidering administration of prazosin  MRI with SAH - continue neuro checks- hold hep dvt proph  Orthostatic hypotension  Holding prazosin  Tolerated PT yesterday   Echo pending  Elevated cr  Strict record urin output  FENa 0.4%, prerenal,   Cr 1.3 --> 1.1, improving with fluids  Start mivf  Oxygen requirement  IS/pulm hygiene  On CPAP home CPAP overnight,   Weaned from 2L NC this morning,   Geriatrics following due to positive frailty  DVT Prophylaxis-held in the setting of intracranial bleed    Chief Complaint: \"I am doing okay today\"    SUBJECTIVE    Patient seen and examined at the bedside.  No acute events reported overnight.  Vital signs stable this a.m.  Patient was on cPAP overnight, on 2L NC this morning. Weaned off of NC to room air. She reports that she was able to ambulate down the hallway yesterday without any lightheadedness. She does report intermittent pain on the left side of her head that goes away on its own. She reports that this has been going on for the past month.     OBJECTIVE  VITALS:   Vitals:    10/02/24 0400   BP: 114/70   Pulse: 56   Resp: 16   Temp: 97.8 °F (36.6 °C)   SpO2: 97%     Physical Exam  Constitutional:       Appearance: Normal appearance.   HENT:      Head: Normocephalic and atraumatic.      Right Ear: External ear normal.      Left Ear: External ear normal.      Nose: Nose normal.      Mouth/Throat:      Mouth: Mucous membranes are moist.   Eyes:      Extraocular Movements: Extraocular movements intact.   Cardiovascular:      Rate and Rhythm: Normal rate and regular rhythm.      Pulses: Normal pulses.   Pulmonary:      Effort: Pulmonary effort is normal.   Abdominal:      General: Abdomen is flat.      Palpations:

## 2024-10-02 NOTE — CARE COORDINATION
Met with the patient and her . Explained the IMM and copy provided. Patient okay with discharging with less than 4 hours notice. States she will return home with her  and will have someone at the home to help until she is strong enough to manage on her own. Request walker DME order be faxed to [facility].    1138 DME order faxed to [facility].

## 2024-10-02 NOTE — PLAN OF CARE
Problem: Discharge Planning  Goal: Discharge to home or other facility with appropriate resources  10/2/2024 0419 by Clotilde Garcia RN  Outcome: Progressing  10/1/2024 1755 by Sherly Ceballos RN  Outcome: Progressing  Flowsheets (Taken 10/1/2024 0800)  Discharge to home or other facility with appropriate resources: Identify barriers to discharge with patient and caregiver     Problem: Pain  Goal: Verbalizes/displays adequate comfort level or baseline comfort level  10/2/2024 0419 by Clotilde Garcia RN  Outcome: Progressing  10/1/2024 1755 by Sherly Ceballos RN  Outcome: Progressing  Flowsheets (Taken 10/1/2024 1755)  Verbalizes/displays adequate comfort level or baseline comfort level: Encourage patient to monitor pain and request assistance     Problem: Skin/Tissue Integrity  Goal: Absence of new skin breakdown  Description: 1.  Monitor for areas of redness and/or skin breakdown  2.  Assess vascular access sites hourly  3.  Every 4-6 hours minimum:  Change oxygen saturation probe site  4.  Every 4-6 hours:  If on nasal continuous positive airway pressure, respiratory therapy assess nares and determine need for appliance change or resting period.  10/2/2024 0419 by Clotilde Garcia RN  Outcome: Progressing  10/1/2024 1755 by Sherly Ceballos RN  Outcome: Progressing     Problem: Safety - Adult  Goal: Free from fall injury  10/2/2024 0419 by Clotilde Garcia RN  Outcome: Progressing  10/1/2024 1755 by Sherly Ceballos RN  Outcome: Progressing  Flowsheets (Taken 10/1/2024 1515)  Free From Fall Injury: Instruct family/caregiver on patient safety     Problem: Chronic Conditions and Co-morbidities  Goal: Patient's chronic conditions and co-morbidity symptoms are monitored and maintained or improved  10/2/2024 0419 by Clotilde Garcia RN  Outcome: Progressing  10/1/2024 1755 by Sherly Ceballos RN  Outcome: Progressing

## 2024-10-02 NOTE — PROGRESS NOTES
[facility]  Office: [phone]  Barry Moe DO, Everett Cohen, DO, Abdulaziz Colindres DO, Prashant Benitez, DO, Steffanie Escamilla MD, Margarita Keene MD, Eliza Yang MD, Tatyana Swain MD,  Kin Simental MD, Willie Gonzalez MD, Marii Pedroza MD,  Jerman Soriano DO, Porfirio Wilder MD, Steven Rodriges MD, Duane Moe DO, Funmi Reyez MD,  Gerard Franco DO, Kelli Garibay MD, Chrissie Martinez MD, Lou Zhu MD, Margarita Corbin MD,  Rick Cooley MD, Sabi Conti MD, Geeta Lopes MD, Richelle Marie MD, Hang Helm MD, Nurys Monson MD, Yoel aRnd, DO, Jaylen Harrington DO, Saúl Olmos DO, Ayan Badillo MD, Shirley Waterhouse, CNP,  Jennyfer Mcallister CNP, Yoel Mitchell, CNP,  Niya Roldan, DNP, Camille Cabral, CNP, Lavinia Brown, CNP, Geni Wilkerson, CNP, Saira Reynolds, CNP, Ailyn Mcintyre, PA-C, Beryl Mcmahan PA-C, Mary Anne Kaur, CNP, Sandy Sanchez, CNP,  Madhuri Arreola, CNP, Jeny Goldman, CNP, Nelly Wu, CNP, Serena Collazo, CNS, Antionette Almaguer, CNP, Negra Barber, CNP, Tracy Schwab, CNP         [facility]   IN-PATIENT SERVICE   [facility]    Progress Note    10/2/2024    11:14 AM    Name:   Paradise Fairchild  MRN:     [id number]     Acct:      [id number]   Room:   0139/0139-01   Day:  4  Admit Date:  9/28/2024 12:36 AM    PCP:   Albaro Rodriges DO  Code Status:  Full Code    Subjective:     C/C:   Chief Complaint   Patient presents with    Fall     [place]     Interval History Status: not changed.     No issues overnight  Working on placement  No other complaints  Discussed with  at bedside     Medications:     Allergies:    Allergies   Allergen Reactions    Nsaids        Current Meds:   Scheduled Meds:    polyethylene glycol  17 g Oral BID    heparin (porcine)  5,000 Units SubCUTAneous 3 times per day    insulin lispro  0-16 Units SubCUTAneous TID WC    insulin lispro  0-4 Units SubCUTAneous Nightly    buPROPion  300  Cirrhosis with moderate splenomegaly and small volume of ascites.     CT THORACIC SPINE BONY RECONSTRUCTION    Result Date: 9/28/2024  1. No acute traumatic injury of the chest, abdomen, or pelvis. 2. No acute osseous abnormality of the thoracic or lumbar spine. 3. Cirrhosis with moderate splenomegaly and small volume of ascites.     CT LUMBAR SPINE BONY RECONSTRUCTION    Result Date: 9/28/2024  1. No acute traumatic injury of the chest, abdomen, or pelvis. 2. No acute osseous abnormality of the thoracic or lumbar spine. 3. Cirrhosis with moderate splenomegaly and small volume of ascites.       Physical Examination:        General appearance:  alert, cooperative and no distress  Mental Status:  oriented to person, place and time and normal affect  Lungs:  clear to auscultation bilaterally, normal effort  Heart:  regular rate and rhythm  Abdomen:  soft, nontender, nondistended, normal bowel sounds  Extremities:  no edema, redness, tenderness in the calves  Skin:  no gross lesions, rashes, induration    Assessment:        Hospital Problems             Last Modified POA    * (Principal) Subarachnoid bleed ([facility]) 9/28/2024 Yes    Fall 9/28/2024 Yes    Thrombocytopenia ([facility]) 9/29/2024 Yes    Depression 9/29/2024 Yes    Acquired hypothyroidism 9/29/2024 Yes    GERD (gastroesophageal reflux disease) 9/29/2024 Yes    Fibromyalgia 9/29/2024 Yes    Sjogren's disease ([facility]) 9/29/2024 Yes    Tremor 9/29/2024 Yes    Syncope 9/29/2024 Yes    Uncontrolled type 2 diabetes mellitus with hyperglycemia ([facility]) 9/30/2024 Yes       Plan:        - Vitals, labs, medications reviewed   - ECHO reviewed, EF preserved   - Continue selected home medications   - Monitor glucose, continue insulin correction   - DC planning per primary  - Ok to DC from IM perspective    Thank you for consult, call with questions    Kin Simental MD  10/2/2024  11:14 AM

## 2024-10-02 NOTE — PLAN OF CARE
Problem: Discharge Planning  Goal: Discharge to home or other facility with appropriate resources  10/2/2024 1243 by Sherly Ceballos RN  Flowsheets (Taken 10/2/2024 0800)  Discharge to home or other facility with appropriate resources: Identify barriers to discharge with patient and caregiver  10/2/2024 0419 by Clotilde Garcia RN  Outcome: Progressing     Problem: Pain  Goal: Verbalizes/displays adequate comfort level or baseline comfort level  10/2/2024 1243 by Sherly Ceballos RN  Flowsheets (Taken 10/1/2024 1755)  Verbalizes/displays adequate comfort level or baseline comfort level: Encourage patient to monitor pain and request assistance  10/2/2024 0419 by Clotilde Garcia RN  Outcome: Progressing     Problem: Skin/Tissue Integrity  Goal: Absence of new skin breakdown  Description: 1.  Monitor for areas of redness and/or skin breakdown  2.  Assess vascular access sites hourly  3.  Every 4-6 hours minimum:  Change oxygen saturation probe site  4.  Every 4-6 hours:  If on nasal continuous positive airway pressure, respiratory therapy assess nares and determine need for appliance change or resting period.  10/2/2024 0419 by Clotilde Garcia RN  Outcome: Progressing     Problem: Safety - Adult  Goal: Free from fall injury  10/2/2024 1243 by Sherly Ceballos RN  Flowsheets (Taken 10/2/2024 0906)  Free From Fall Injury: Instruct family/caregiver on patient safety  10/2/2024 0419 by Clotilde Garcia RN  Outcome: Progressing     Problem: Chronic Conditions and Co-morbidities  Goal: Patient's chronic conditions and co-morbidity symptoms are monitored and maintained or improved  10/2/2024 0419 by Clotilde Garcia RN  Outcome: Progressing

## 2024-10-02 NOTE — PROGRESS NOTES
[facility] ([facility]) Inpatient Discharge Summary  PADMINI Beard  10/2/2024        Paradise Fairchild  1950  [id number]    Date & Time of Discharge: 10/2/2024  1:15 PM     Is consult a Victim of Crime?: No    Services provided:  Psychotherapy Individual, Informational Packet Provided, and Referrals    Screen Results (If any):      N/A      Discharge Recommendations:  Follow up with [facility] or other new mental health provider       The therapist may be reached through the [facility] offices at [phone].

## 2024-10-02 NOTE — CARE COORDINATION
RN completing DC paperwork. DME ordered for delivery to pt's home. Pt to discharge home with  today.

## 2024-10-02 NOTE — PROGRESS NOTES
Occupational Therapy  Facility/Department: [address] STEPDOWN   Daily Treatment Note  Patient Name: Paradise Fairchild        MRN: [id number]    : 1950    Date of Service: 10/2/2024    Discharge Recommendations   Further therapy recommended at discharge.The patient should be able to tolerate at least 3 hours of therapy per day over 5 days or 15 hours over 7 days.   This patient may benefit from a Physical Medicine and Rehab consult.      OT Equipment Recommendations  Equipment Needed: Yes  ADL Assistive Devices: Shower Chair with back    Assessment  Performance deficits / Impairments: Decreased functional mobility ;Decreased ADL status;Decreased high-level IADLs;Decreased safe awareness;Decreased cognition;Decreased endurance;Decreased balance  Prognosis: Good  Decision Making: Medium Complexity  REQUIRES OT FOLLOW-UP: Yes  Activity Tolerance  Activity Tolerance: Patient Tolerated treatment well  Safety Devices  Type of Devices: Gait belt;Call light within reach;Nurse notified;All fall risk precautions in place;Left in chair;Patient at risk for falls  Restraints  Restraints Initially in Place: No    Restrictions/Precautions  Restrictions/Precautions  Restrictions/Precautions: Fall Risk  Position Activity Restriction  Other position/activity restrictions: up w/ assist    Subjective  General  Family / Caregiver Present: Yes (Spouse mid way through session.)  Diagnosis: Fall, SAH, +LOC?  Subjective  Subjective: RN approved therapy session, pt states having a chronic head ach, pt. did not rate, distraction/activity increased,    Objective  Orientation  Overall Orientation Status: Within Functional Limits  Orientation Level: Oriented X4  Cognition  Overall Cognitive Status: WFL    Activities of Daily Living  Grooming: Stand by assistance  Grooming Skilled Clinical Factors: SBA to ensure stability, standing at sink to brush hair, brush teeth/oral care and wash face.  LE Dressing: Supervision  LE Dressing Skilled Clinical  including rolling, at (I)  Short Term Goal 5: demo bending/reaching high/low func activity for 8 min+, while standing, using LRAD PRN and mod I    Plan  Occupational Therapy Plan  Times Per Week: 4x  Current Treatment Recommendations: Self-Care / ADL, Pain management, Home management training, Safety education & training, Balance training, Functional mobility training, Patient/Caregiver education & training, Endurance training, Equipment evaluation, education, & procurement    AM-PAC Daily Activities Inpatient  AM-PAC Daily Activity - Inpatient   How much help is needed for putting on and taking off regular lower body clothing?: A Little  How much help is needed for bathing (which includes washing, rinsing, drying)?: A Little  How much help is needed for toileting (which includes using toilet, bedpan, or urinal)?: A Little  How much help is needed for putting on and taking off regular upper body clothing?: None  How much help is needed for taking care of personal grooming?: A Little  How much help for eating meals?: None  AM-[facility] Inpatient Daily Activity Raw Score: 20  AM-PAC Inpatient ADL T-Scale Score : 42.03  ADL Inpatient CMS 0-100% Score: 38.32  ADL Inpatient CMS G-Code Modifier : CJ    Minutes  OT Individual Minutes  Time In: 0918  Time Out: 1003  Minutes: 45  Time Code Minutes   Timed Code Treatment Minutes: 45 Minutes    Electronically signed by CONSTANTINO Khan on 10/2/24 at 10:21 AM EDT

## 2024-10-02 NOTE — CASE COMMUNICATION
Paradise Fairchild was evaluated today and a DME order was entered for a wheeled walker because she requires this to successfully complete daily living tasks of bathing, toileting, personal cares, and ambulating.  A wheeled walker is necessary due to the patient's unsteady gait, upper body weakness, and inability to  an ambulation device; and she can ambulate only by pushing a walker instead of a lesser assistive device such as a cane, crutch, or standard walker.  The need for this equipment was discussed with the patient and she understands and is in agreement.

## 2024-10-03 DIAGNOSIS — M79.7 FIBROMYALGIA: ICD-10-CM
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
## 2024-10-03 DIAGNOSIS — R55 SYNCOPE, UNSPECIFIED SYNCOPE TYPE: ICD-10-CM

## 2024-10-03 DIAGNOSIS — R26.81 UNSTEADY GAIT: ICD-10-CM

## 2024-10-14 NOTE — PROGRESS NOTES
Physician Progress Note      PATIENT:               BRADLEY MINOR  CSN #:                  [id number]  :                       1950  ADMIT DATE:       2024 12:36 AM  DISCH DATE:        10/2/2024 1:15 PM  RESPONDING  PROVIDER #:        Yanick Prieto MD          QUERY TEXT:    Patient admitted with traumatic SAH. Noted documentation of traumatic SAH   without LOC on H&P and traumatic SAH w/ LOC as per Neurology PN of  &   . If possible, please document if there was a LOC, and if so is it   possible for the time frame to be identified, if known:    The medical record reflects the following:  Risk Factors: fall at home, right foot droop with splint.  Clinical Indicators: Patient fell at home when in her bedroom and after about   5 minutes called out for help from her , as she was on the floor and   unaware as to how it happened.  Patient is unaware of sequence of events but   RLE splint d/t foot drop and has a balance disorder and those were in place at   time of fall. Upon transfer to [facility] is A&Ox4, following commands. MRI BRAIN-   Trace amount of subarachnoid blood along the paramedian right parietal lobe.   No significant mass effect or midline shift.  Treatment: transfer to [facility] for neurology evaluation. Neuro checks, Neurology   consulted. Oxygen therapy with CPAP over night - home baseline.  Options provided:  -- traumatic SAH without loss of consciousness confirmed and traumatic SAH   with LOC ruled out.  -- traumatic SAH with loss of consciousness with unknown time frame confirmed   and traumatic SAH without LOC ruled out.  -- traumatic SAH with loss of consciousness of 30 minutes or less, confirmed   and traumatic SAH with LOC ruled out.  -- traumatic SAH with loss of consciousness of 31-59 minutes or less,   confirmed and traumatic SAH with LOC ruled out.  -- Other - I will add my own diagnosis  -- Disagree - Not applicable / Not valid  -- Disagree - Clinically unable to determine
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
## 2025-01-09 ENCOUNTER — OFFICE VISIT (OUTPATIENT)
Dept: PHYSICAL MEDICINE AND REHAB | Age: 75
End: 2025-01-09
Payer: MEDICARE

## 2025-01-09 VITALS
WEIGHT: 144 LBS | BODY MASS INDEX: 26.5 KG/M2 | HEART RATE: 72 BPM | HEIGHT: 62 IN | SYSTOLIC BLOOD PRESSURE: 118 MMHG | DIASTOLIC BLOOD PRESSURE: 78 MMHG

## 2025-01-09 DIAGNOSIS — G47.01 INSOMNIA DUE TO MEDICAL CONDITION: ICD-10-CM

## 2025-01-09 DIAGNOSIS — S06.9XAD TRAUMATIC BRAIN INJURY, WITH UNKNOWN LOSS OF CONSCIOUSNESS STATUS, SUBSEQUENT ENCOUNTER: Primary | ICD-10-CM

## 2025-01-09 DIAGNOSIS — G44.309 POST-CONCUSSION HEADACHE: ICD-10-CM

## 2025-01-09 PROCEDURE — G8400 PT W/DXA NO RESULTS DOC: HCPCS | Performed by: PHYSICAL MEDICINE & REHABILITATION

## 2025-01-09 PROCEDURE — 3017F COLORECTAL CA SCREEN DOC REV: CPT | Performed by: PHYSICAL MEDICINE & REHABILITATION

## 2025-01-09 PROCEDURE — 1123F ACP DISCUSS/DSCN MKR DOCD: CPT | Performed by: PHYSICAL MEDICINE & REHABILITATION

## 2025-01-09 PROCEDURE — 1036F TOBACCO NON-USER: CPT | Performed by: PHYSICAL MEDICINE & REHABILITATION

## 2025-01-09 PROCEDURE — G8427 DOCREV CUR MEDS BY ELIG CLIN: HCPCS | Performed by: PHYSICAL MEDICINE & REHABILITATION

## 2025-01-09 PROCEDURE — 99214 OFFICE O/P EST MOD 30 MIN: CPT | Performed by: PHYSICAL MEDICINE & REHABILITATION

## 2025-01-09 PROCEDURE — 1159F MED LIST DOCD IN RCRD: CPT | Performed by: PHYSICAL MEDICINE & REHABILITATION

## 2025-01-09 PROCEDURE — G8419 CALC BMI OUT NRM PARAM NOF/U: HCPCS | Performed by: PHYSICAL MEDICINE & REHABILITATION

## 2025-01-09 PROCEDURE — 1090F PRES/ABSN URINE INCON ASSESS: CPT | Performed by: PHYSICAL MEDICINE & REHABILITATION

## 2025-01-09 RX ORDER — TRAMADOL HYDROCHLORIDE 50 MG/1
50 TABLET ORAL EVERY 6 HOURS PRN
COMMUNITY
Start: 2024-12-23

## 2025-01-09 RX ORDER — DULOXETIN HYDROCHLORIDE 30 MG/1
60 CAPSULE, DELAYED RELEASE ORAL DAILY
COMMUNITY
Start: 2024-12-29

## 2025-01-09 RX ORDER — HYDROXYCHLOROQUINE SULFATE 200 MG/1
TABLET, FILM COATED ORAL
COMMUNITY
Start: 2024-11-19

## 2025-01-09 NOTE — PROGRESS NOTES
[facility] PHYSICAL MEDICINE AND REHABILITATION  [address] [place] [address]  [place] [address]  Dept: [phone]  Dept Fax: [phone]    Outpatient Followup Note    Paradise Fairchild, 74 y.o., female, presents for follow up c/o of Headache (con), Memory Loss, and Fatigue  .     HPI:     HPI  Patient who was seen at [facility] after sustaining TBI from a fall at home with SAH confirmed on CT scan. She admitted to [facility] 9/28/24 and was discharged home from the hospital. She is being seen in follow up today. She notes continued issues with memory, headache, sleep, and fatigue since discharge. She also notes worsening of her balance and has had one fall since her discharge home. She had prior PT for impaired balance with vestibular therapy included prior to her hospitalization in September. She has had no therapies since her discharge from the hospital. She is accompanied by her  today.     TBI    Sleep: Insomnia - difficulty falling asleep 2-3 times/week, usually at least once per week. Using melatonin at hs 5 mg  Daytime Focus/Attention: Impaired and not treated - noting increased distractibility since her injury  Headache: persistent - daily headache with intermittent worsening. Daily headache is posterior/neck pain described as throbbing. She has intermittent L parietal/posterior headache which starts as a \"zing\" and can be sharp radiating pain. Pain can be severe, rated up to 9-10/10. She has been using tramadol prn prescribed by her PCP with no relief. She notes some associated blurred vision and light sensitivity  Agitation: Intermittent and different from pre-injury status  Executive Function: impaired - trouble with sequencing tasks  Mood: Depressed and Anxious at times.  Therapy:  None  Support: spouse / significant other and children / family / friends to help, daughter  Spasticity: None  DME: Straight [employer] - as needed      No past

## 2025-01-10 ENCOUNTER — TELEPHONE (OUTPATIENT)
Dept: PHYSICAL MEDICINE AND REHAB | Age: 75
End: 2025-01-10

## 2025-01-10 DIAGNOSIS — G44.309 POST-CONCUSSION HEADACHE: ICD-10-CM

## 2025-01-10 DIAGNOSIS — S06.9XAD TRAUMATIC BRAIN INJURY, WITH UNKNOWN LOSS OF CONSCIOUSNESS STATUS, SUBSEQUENT ENCOUNTER: Primary | ICD-10-CM

## 2025-01-10 NOTE — TELEPHONE ENCOUNTER
Pended an order for Occupational therapy to go to [facility] OT dept for  Evaluation.

## 2025-02-24 ENCOUNTER — HOSPITAL ENCOUNTER (OUTPATIENT)
Dept: PHYSICAL THERAPY | Age: 75
Setting detail: THERAPIES SERIES
Discharge: HOME | End: 2025-02-24
Payer: MEDICARE

## 2025-02-24 PROCEDURE — 97165 OT EVAL LOW COMPLEX 30 MIN: CPT

## 2025-02-24 NOTE — DISCHARGE SUMMARY
[facility]  OCCUPATIONAL THERAPY  DRIVING EVALUATION    PATIENT: Paradise Fairchild  YOB: 1950  GENDER:  female  CSN: [id number]  Referring Practitioner Doretha Moreau MD [phone]      Diagnosis Unspecified intracranial injury with loss of consciousness status unknown, subsequent encounter  Post-traumatic headache, unspecified, not intractable   Treatment Diagnosis R41.89 Other Symptoms and Signs Involving Cognitive Functions and Awareness   Date of Evaluation 2/24/25   Additional Pertinent History Paradise Fairchild has no past medical history on file.  she has no past surgical history on file.     Allergies Allergies   Allergen Reactions    Ibuprofen Anaphylaxis    Amoxicillin-Pot Clavulanate     Aspirin-Caffeine Hives    Codeine Hives and Other (See Comments)    Salicylic Acid     Nsaids     Propoxyphene       Medications   Current Outpatient Medications:     hydroxychloroquine (PLAQUENIL) 200 MG tablet, TAKE 1 TABLET BY MOUTH ONCE DAILY FOR 90 DAYS, Disp: , Rfl:     metFORMIN (GLUCOPHAGE) 500 MG tablet, Take 2 tablets by mouth 2 times daily, Disp: , Rfl:     traMADol (ULTRAM) 50 MG tablet, Take 1 tablet by mouth every 6 hours as needed., Disp: , Rfl:     DULoxetine (CYMBALTA) 30 MG extended release capsule, Take 2 capsules by mouth daily, Disp: , Rfl:     Misc. Devices (WALKER) MISC, WHEELED WALKER WITH SEAT, Disp: 1 each, Rfl: 0    buPROPion (WELLBUTRIN XL) 150 MG extended release tablet, Take 2 tablets by mouth daily, Disp: , Rfl:     vitamin B-12 (CYANOCOBALAMIN) 1000 MCG tablet, Place 1 tablet under the tongue Daily with supper, Disp: , Rfl:     DULoxetine (CYMBALTA) 60 MG extended release capsule, Take 1 capsule by mouth daily, Disp: , Rfl:     gabapentin (NEURONTIN) 100 MG capsule, Take 2 capsules by mouth every morning., Disp: , Rfl:     gabapentin (NEURONTIN) 600 MG tablet, Take 1 tablet by mouth nightly. at bedtime., Disp: , Rfl:     levothyroxine (SYNTHROID)

## 2025-02-28 ENCOUNTER — TELEPHONE (OUTPATIENT)
Dept: PHYSICAL MEDICINE AND REHAB | Age: 75
End: 2025-02-28

## 2025-02-28 NOTE — TELEPHONE ENCOUNTER
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 Please review.  Thank you.

## 2025-04-03 ENCOUNTER — OFFICE VISIT (OUTPATIENT)
Dept: PHYSICAL MEDICINE AND REHAB | Age: 75
End: 2025-04-03
Payer: MEDICARE

## 2025-04-03 VITALS
HEART RATE: 56 BPM | DIASTOLIC BLOOD PRESSURE: 64 MMHG | WEIGHT: 149 LBS | BODY MASS INDEX: 27.42 KG/M2 | TEMPERATURE: 97.6 F | HEIGHT: 62 IN | SYSTOLIC BLOOD PRESSURE: 130 MMHG | OXYGEN SATURATION: 98 %

## 2025-04-03 DIAGNOSIS — S06.9XAD TRAUMATIC BRAIN INJURY, WITH UNKNOWN LOSS OF CONSCIOUSNESS STATUS, SUBSEQUENT ENCOUNTER: Primary | ICD-10-CM

## 2025-04-03 DIAGNOSIS — F32.A DEPRESSION, UNSPECIFIED DEPRESSION TYPE: ICD-10-CM

## 2025-04-03 DIAGNOSIS — G44.309 POST-CONCUSSION HEADACHE: ICD-10-CM

## 2025-04-03 DIAGNOSIS — G47.01 INSOMNIA DUE TO MEDICAL CONDITION: ICD-10-CM

## 2025-04-03 PROCEDURE — G8427 DOCREV CUR MEDS BY ELIG CLIN: HCPCS | Performed by: PHYSICAL MEDICINE & REHABILITATION

## 2025-04-03 PROCEDURE — G8419 CALC BMI OUT NRM PARAM NOF/U: HCPCS | Performed by: PHYSICAL MEDICINE & REHABILITATION

## 2025-04-03 PROCEDURE — 1123F ACP DISCUSS/DSCN MKR DOCD: CPT | Performed by: PHYSICAL MEDICINE & REHABILITATION

## 2025-04-03 PROCEDURE — G8400 PT W/DXA NO RESULTS DOC: HCPCS | Performed by: PHYSICAL MEDICINE & REHABILITATION

## 2025-04-03 PROCEDURE — 1036F TOBACCO NON-USER: CPT | Performed by: PHYSICAL MEDICINE & REHABILITATION

## 2025-04-03 PROCEDURE — 3017F COLORECTAL CA SCREEN DOC REV: CPT | Performed by: PHYSICAL MEDICINE & REHABILITATION

## 2025-04-03 PROCEDURE — 99214 OFFICE O/P EST MOD 30 MIN: CPT | Performed by: PHYSICAL MEDICINE & REHABILITATION

## 2025-04-03 PROCEDURE — 1090F PRES/ABSN URINE INCON ASSESS: CPT | Performed by: PHYSICAL MEDICINE & REHABILITATION

## 2025-04-03 PROCEDURE — 1159F MED LIST DOCD IN RCRD: CPT | Performed by: PHYSICAL MEDICINE & REHABILITATION

## 2025-04-03 RX ORDER — QUETIAPINE FUMARATE 25 MG/1
12.5 TABLET, FILM COATED ORAL
Qty: 15 TABLET | Refills: 2 | Status: SHIPPED | OUTPATIENT
Start: 2025-04-03

## 2025-04-03 NOTE — PROGRESS NOTES
hydroxychloroquine (PLAQUENIL) 200 MG tablet TAKE 1 TABLET BY MOUTH ONCE DAILY FOR 90 DAYS      metFORMIN (GLUCOPHAGE) 500 MG tablet Take 2 tablets by mouth 2 times daily      traMADol (ULTRAM) 50 MG tablet Take 1 tablet by mouth every 6 hours as needed.      DULoxetine (CYMBALTA) 30 MG extended release capsule Take 2 capsules by mouth daily      Misc. Devices (WALKER) MISC WHEELED WALKER WITH SEAT 1 each 0    buPROPion (WELLBUTRIN XL) 150 MG extended release tablet Take 2 tablets by mouth daily      vitamin B-12 (CYANOCOBALAMIN) 1000 MCG tablet Place 1 tablet under the tongue Daily with supper      DULoxetine (CYMBALTA) 60 MG extended release capsule Take 1 capsule by mouth daily      gabapentin (NEURONTIN) 100 MG capsule Take 2 capsules by mouth every morning.      gabapentin (NEURONTIN) 600 MG tablet Take 1 tablet by mouth nightly. at bedtime.      levothyroxine (SYNTHROID) 112 MCG tablet Take 1 tablet by mouth daily      omeprazole (PRILOSEC) 40 MG delayed release capsule Take 1 capsule by mouth in the morning and at bedtime      pravastatin (PRAVACHOL) 40 MG tablet Take 1 tablet by mouth daily      prazosin (MINIPRESS) 1 MG capsule Take 2 capsules by mouth nightly      propranolol (INDERAL) 10 MG tablet Take 1 tablet by mouth 2 times daily       No current facility-administered medications for this visit.     Allergies   Allergen Reactions    Ibuprofen Anaphylaxis    Amoxicillin-Pot Clavulanate     Aspirin-Caffeine Hives    Codeine Hives and Other (See Comments)    Salicylic Acid     Nsaids     Propoxyphene        Subjective:      Review of Systems  Constitutional: Negative for fever, chills and unexpected weight change.   HENT: Negative for trouble swallowing.    Respiratory: Negative for cough and shortness of breath.    Cardiovascular: Negative for chest pain.   Endocrine: Negative for polyuria.   Genitourinary: Negative for dysuria, urgency, frequency, incontinence and difficulty urinating.

## 2025-04-15 ENCOUNTER — CLINICAL DOCUMENTATION (OUTPATIENT)
Dept: PHYSICAL MEDICINE AND REHAB | Age: 75
End: 2025-04-15

## 2025-04-15 NOTE — PROGRESS NOTES
Spoke with the patient and gave her the numbers for Located within [facility] in [place] [phone] and [facility] at  [phone]. I told the patient to let us know where she decides to go. She has not called anyone as of yet but will let us know.

## 2025-07-03 ENCOUNTER — OFFICE VISIT (OUTPATIENT)
Dept: PHYSICAL MEDICINE AND REHAB | Age: 75
End: 2025-07-03

## 2025-07-03 VITALS — SYSTOLIC BLOOD PRESSURE: 105 MMHG | DIASTOLIC BLOOD PRESSURE: 62 MMHG | TEMPERATURE: 98.4 F | HEART RATE: 64 BPM

## 2025-07-03 DIAGNOSIS — G44.309 POST-CONCUSSION HEADACHE: Primary | ICD-10-CM

## 2025-07-03 DIAGNOSIS — I69.122 DYSARTHRIA FOLLOWING NONTRAUMATIC INTRACEREBRAL HEMORRHAGE: ICD-10-CM

## 2025-07-03 DIAGNOSIS — S06.9XAD TRAUMATIC BRAIN INJURY, WITH UNKNOWN LOSS OF CONSCIOUSNESS STATUS, SUBSEQUENT ENCOUNTER: ICD-10-CM

## 2025-07-03 DIAGNOSIS — I69.120 APHASIA FOLLOWING NONTRAUMATIC INTRACEREBRAL HEMORRHAGE: ICD-10-CM

## 2025-07-03 RX ORDER — LEVETIRACETAM 1000 MG/1
1000 TABLET ORAL 2 TIMES DAILY
COMMUNITY
Start: 2025-06-26

## 2025-07-03 RX ORDER — AMLODIPINE BESYLATE 5 MG/1
5 TABLET ORAL DAILY
COMMUNITY
Start: 2025-05-14

## 2025-07-03 NOTE — PROGRESS NOTES
[facility] PHYSICAL MEDICINE AND REHABILITATION  [address] [place] [address]  Dept: [phone]  Dept Fax: [phone]    Outpatient Followup Note    Paradise Fairchild, 75 y.o., female, presents for follow up c/o of Neurologic Problem  .     HPI:     History of Present Illness  The patient presents for a follow-up of her brain injury.    She experienced a head injury in April when she accidentally hit her head on a cabinet after bending over in the bathroom. This led to an ER visit where a CT scan revealed a lesion, initially suspected to be malignant due to her history of kidney cancer. However, a biopsy confirmed it was a blood clot. This was surgically resected by neurology at [facility]. She then underwent a month-long rehabilitation at [facility] in [place] and was discharged home on 06/19/2025. A week after her discharge, she had a seizure and was admitted to the hospital on 06/25/2025, but was released the following day. She is currently on anti-seizure medication and receives home health care for continued rehabilitation. She uses a walker for mobility at home and a wheelchair for outdoor activities. She has all the necessary equipment at home, including a wheelchair, shower chair, and toilet riser. She had a fall on her first night home but did not sustain any injuries. She has a history of a broken tailbone, which was aggravated by that fall and is using a cushion in her wheelchair. She has an upcoming appointment with a neurologist at [facility] next week regarding her seizure and headaches.    She experiences daily headaches, primarily at the back of her head, described as dull and aching. She also reports nausea and light sensitivity during seizures but not during other headaches since her hospital stay. She has a history of migraines and was considering trying a new migraine medication before her head injury

## 2025-07-15 ENCOUNTER — OFFICE VISIT (OUTPATIENT)
Dept: OBGYN CLINIC | Age: 75
End: 2025-07-15
Payer: MEDICARE

## 2025-07-15 ENCOUNTER — HOSPITAL ENCOUNTER (OUTPATIENT)
Age: 75
Setting detail: SPECIMEN
Discharge: HOME | End: 2025-07-15

## 2025-07-15 VITALS
DIASTOLIC BLOOD PRESSURE: 72 MMHG | WEIGHT: 145.2 LBS | HEIGHT: 62 IN | SYSTOLIC BLOOD PRESSURE: 112 MMHG | BODY MASS INDEX: 26.72 KG/M2

## 2025-07-15 DIAGNOSIS — Z01.419 WOMEN'S ANNUAL ROUTINE GYNECOLOGICAL EXAMINATION: Primary | ICD-10-CM

## 2025-07-15 DIAGNOSIS — R35.0 URINARY FREQUENCY: ICD-10-CM

## 2025-07-15 DIAGNOSIS — Z12.31 ENCOUNTER FOR SCREENING MAMMOGRAM FOR MALIGNANT NEOPLASM OF BREAST: ICD-10-CM

## 2025-07-15 DIAGNOSIS — Z13.820 ENCOUNTER FOR OSTEOPOROSIS SCREENING IN ASYMPTOMATIC POSTMENOPAUSAL PATIENT: ICD-10-CM

## 2025-07-15 DIAGNOSIS — Z78.0 ENCOUNTER FOR OSTEOPOROSIS SCREENING IN ASYMPTOMATIC POSTMENOPAUSAL PATIENT: ICD-10-CM

## 2025-07-15 LAB
BILIRUBIN, POC: ABNORMAL
BLOOD URINE, POC: ABNORMAL
CLARITY, POC: ABNORMAL
COLOR, POC: YELLOW
GLUCOSE URINE, POC: ABNORMAL MG/DL
KETONES, POC: ABNORMAL MG/DL
LEUKOCYTE EST, POC: ABNORMAL
NITRITE, POC: POSITIVE
PH, POC: 5.5
PROTEIN, POC: ABNORMAL MG/DL
SPECIFIC GRAVITY, POC: 1.02
UROBILINOGEN, POC: 0.2 MG/DL

## 2025-07-15 PROCEDURE — G8419 CALC BMI OUT NRM PARAM NOF/U: HCPCS | Performed by: NURSE PRACTITIONER

## 2025-07-15 PROCEDURE — 81002 URINALYSIS NONAUTO W/O SCOPE: CPT | Performed by: NURSE PRACTITIONER

## 2025-07-15 PROCEDURE — G8427 DOCREV CUR MEDS BY ELIG CLIN: HCPCS | Performed by: NURSE PRACTITIONER

## 2025-07-15 PROCEDURE — G0101 CA SCREEN;PELVIC/BREAST EXAM: HCPCS | Performed by: NURSE PRACTITIONER

## 2025-07-15 RX ORDER — NITROFURANTOIN 25; 75 MG/1; MG/1
100 CAPSULE ORAL 2 TIMES DAILY
Qty: 10 CAPSULE | Refills: 0 | Status: SHIPPED | OUTPATIENT
Start: 2025-07-15 | End: 2025-07-20

## 2025-07-15 RX ORDER — CALCIUM CARBONATE 500(1250)
500 TABLET ORAL DAILY
COMMUNITY

## 2025-07-15 RX ORDER — PRAZOSIN HYDROCHLORIDE 2 MG/1
2 CAPSULE ORAL NIGHTLY
COMMUNITY
Start: 2025-05-20

## 2025-07-15 ASSESSMENT — ENCOUNTER SYMPTOMS
SHORTNESS OF BREATH: 0
ABDOMINAL PAIN: 0
DIARRHEA: 0
CONSTIPATION: 0

## 2025-07-15 NOTE — PROGRESS NOTES
YEARLY PHYSICAL    Date of service: 7/15/2025    Paradise Fairchild  Is a 75 y.o. female    PT's PCP is: Rodríguez Hinojosa MD     : 1950                                         Chaperone for Intimate Exam  Chaperone was offered as part of the rooming process. Patient declined and agrees to continue with exam without a chaperone.  Chaperone: n/a      Subjective:       No LMP recorded (lmp unknown). Patient has had a hysterectomy.     Are your menses regular: not applicable    OB History    Para Term  AB Living   2 2    2   SAB IAB Ectopic Molar Multiple Live Births        2      # Outcome Date GA Lbr Noah/2nd Weight Sex Type Anes PTL Lv   2 Para            1 Para                 Social History     Tobacco Use   Smoking Status Never    Passive exposure: Never   Smokeless Tobacco Never        Social History     Substance and Sexual Activity   Alcohol Use None    Comment: pt drinks an occasional glass of wine       Family History   Problem Relation Age of Onset    Diabetes Father     Diabetes Mother     Diabetes Brother     Diabetes Sister        Any family history of breast or ovarian cancer: No    Any family history of blood clots: No      Allergies: Ibuprofen, Amoxicillin-pot clavulanate, Aspirin-caffeine, Codeine, Propoxyphene, Salicylic acid, Ativan [lorazepam], and Nsaids      Current Outpatient Medications:     prazosin (MINIPRESS) 2 MG capsule, Take 1 capsule by mouth nightly, Disp: , Rfl:     calcium carbonate (OSCAL) 500 MG TABS tablet, Take 1 tablet by mouth daily, Disp: , Rfl:     VITAMIN D, ERGOCALCIFEROL, PO, Take by mouth, Disp: , Rfl:     tiZANidine (ZANAFLEX) 4 MG tablet, Take 1 tablet by mouth every 8 hours as needed, Disp: , Rfl:     levETIRAcetam (KEPPRA) 1000 MG tablet, Take 1 tablet by mouth 2 times daily, Disp: , Rfl:     amLODIPine (NORVASC) 5 MG tablet, Take 1 tablet by mouth daily, Disp: , Rfl:

## 2025-07-17 LAB
MICROORGANISM SPEC CULT: ABNORMAL
SERVICE CMNT-IMP: ABNORMAL
SPECIMEN DESCRIPTION: ABNORMAL

## 2025-07-24 ENCOUNTER — TELEPHONE (OUTPATIENT)
Dept: OBGYN CLINIC | Age: 75
End: 2025-07-24

## 2025-07-24 NOTE — TELEPHONE ENCOUNTER
Patient  Kanu called stating that Bactrim was called in recently for a UTI, but causes joint pain and requesting another ATB.  When reviewing her results, it does show Macrobid was sent in intitially and had intermediate coverage.  Bactrim was sent in just in case she was still symptomatic.  Per Kanu, they are unsure if she still has a UTI, but Dr. Hinojosa has ordered labs and a UA.  Explained that before another ATB, should get UA and confirm if needed.  Kanu verbalized understanding and will call with any further questions/concerns.